# Patient Record
Sex: FEMALE | Race: WHITE | Employment: OTHER | URBAN - METROPOLITAN AREA
[De-identification: names, ages, dates, MRNs, and addresses within clinical notes are randomized per-mention and may not be internally consistent; named-entity substitution may affect disease eponyms.]

---

## 2017-03-22 ENCOUNTER — TELEPHONE (OUTPATIENT)
Dept: FAMILY MEDICINE CLINIC | Facility: CLINIC | Age: 67
End: 2017-03-22

## 2017-03-22 NOTE — TELEPHONE ENCOUNTER
LM asking patient to contact our office to verify what insurance has medicare/supplement or medicare advantage. If patient has medicare advantage please schedule supervisit with Dr. Shan Lemos and enter insurance information!

## 2017-04-13 NOTE — TELEPHONE ENCOUNTER
Patient confirmed that she no longer has straight medicare. Patient now has [facility].

## 2017-07-27 ENCOUNTER — TELEPHONE (OUTPATIENT)
Dept: FAMILY MEDICINE CLINIC | Facility: CLINIC | Age: 67
End: 2017-07-27

## 2017-07-27 NOTE — TELEPHONE ENCOUNTER
Dr Kojo Antoine received Pt's Cologuard results. Test negative. Health Maintenance updated. Pt notified. Letter sent to scan.

## 2017-08-08 ENCOUNTER — TELEPHONE (OUTPATIENT)
Dept: FAMILY MEDICINE CLINIC | Facility: CLINIC | Age: 67
End: 2017-08-08

## 2017-08-13 ENCOUNTER — MA CHART PREP (OUTPATIENT)
Dept: FAMILY MEDICINE CLINIC | Facility: CLINIC | Age: 67
End: 2017-08-13

## 2017-08-13 PROBLEM — M85.80 OSTEOPENIA DETERMINED BY X-RAY: Status: ACTIVE | Noted: 2017-08-13

## 2017-08-14 ENCOUNTER — OFFICE VISIT (OUTPATIENT)
Dept: FAMILY MEDICINE CLINIC | Facility: CLINIC | Age: 67
End: 2017-08-14

## 2017-08-14 VITALS
HEART RATE: 72 BPM | DIASTOLIC BLOOD PRESSURE: 70 MMHG | WEIGHT: 124.38 LBS | HEIGHT: 58.75 IN | TEMPERATURE: 98 F | SYSTOLIC BLOOD PRESSURE: 122 MMHG | BODY MASS INDEX: 25.41 KG/M2 | RESPIRATION RATE: 12 BRPM

## 2017-08-14 DIAGNOSIS — Z00.00 ENCOUNTER FOR ANNUAL HEALTH EXAMINATION: ICD-10-CM

## 2017-08-14 DIAGNOSIS — H80.90 OTOSCLEROSIS, UNSPECIFIED LATERALITY: ICD-10-CM

## 2017-08-14 DIAGNOSIS — Z13.1 SCREENING FOR DIABETES MELLITUS: Primary | ICD-10-CM

## 2017-08-14 DIAGNOSIS — Z12.31 VISIT FOR SCREENING MAMMOGRAM: ICD-10-CM

## 2017-08-14 DIAGNOSIS — Z13.29 SCREENING FOR THYROID DISORDER: ICD-10-CM

## 2017-08-14 DIAGNOSIS — Z13.220 SCREENING CHOLESTEROL LEVEL: ICD-10-CM

## 2017-08-14 DIAGNOSIS — Z13.31 DEPRESSION SCREENING: ICD-10-CM

## 2017-08-14 DIAGNOSIS — M85.80 OSTEOPENIA DETERMINED BY X-RAY: ICD-10-CM

## 2017-08-14 DIAGNOSIS — D35.1 PARATHYROID ADENOMA: ICD-10-CM

## 2017-08-14 PROCEDURE — G0439 PPPS, SUBSEQ VISIT: HCPCS | Performed by: FAMILY MEDICINE

## 2017-08-14 PROCEDURE — 96160 PT-FOCUSED HLTH RISK ASSMT: CPT | Performed by: FAMILY MEDICINE

## 2017-08-14 PROCEDURE — 99397 PER PM REEVAL EST PAT 65+ YR: CPT | Performed by: FAMILY MEDICINE

## 2017-08-14 NOTE — PATIENT INSTRUCTIONS
Asher Pi Ullrich's SCREENING SCHEDULE   Tests on this list are recommended by your physician but may not be covered, or covered at this frequency, by your insurer. Please check with your insurance carrier before scheduling to verify coverage.    PREVENTATI every 10 years- more often if abnormal Colonoscopy,3 Years due on 07/10/2020 Update Health Maintenance if applicable    Flex Sigmoidoscopy Screen  Covered every 5 years No results found for this or any previous visit. No flowsheet data found.      Fecal Occ Pneumococcal 23 (Pneumovax)  Covered Once after 65 No orders found for this or any previous visit. Please get once after your 65th birthday    Hepatitis B for Moderate/High Risk       No orders found for this or any previous visit.  Medium/high risk fact

## 2017-08-14 NOTE — PROGRESS NOTES
HPI:   Ciro Gonzales is a 74year old female who presents for a Medicare Subsequent Annual Wellness visit (Pt already had Initial Annual Wellness). Ultrafast heart CT score was 0 this year. Does Randa 4 times per week.      had mild stroke th not drink alcohol or use drugs.      REVIEW OF SYSTEMS:   GENERAL: feels well otherwise  SKIN: denies any unusual skin lesions  EYES: denies blurred vision or double vision  HEENT: denies nasal congestion, sinus pain or ST  LUNGS: denies shortness of breath symmetric, no tenderness/mass/nodules; no carotid bruit or JVD  Surgery scar present-well healed, good cosmetic appearance   Back:   Symmetric, no curvature, ROM normal, no CVA tenderness   Lungs:   Clear to auscultation bilaterally, respirations unlabored ordered. Mamm ordered. Ms. Ciara Sherwood does not currently take aspirin. We discussed the risks and benefits of aspirin therapy.    Felix Beth is unable to use daily aspirin therapy For the following reasons:   Patient decided that the risks of aspirin Problems?: No     Functional Status     Hearing Problems?: No    Vision Problems? : No    Difficulty walking?: No    Difficulty dressing or bathing?: No    Problems with daily activities? : No    Memory Problems?: No      Fall/Risk Assessment     Do you ha 115        EKG - w/ Initial Preventative Physical Exam only, or if medically necessary Electrocardiogram date12/31/2014       Colorectal Cancer Screening      Colonoscopy Screen every 10 years Colonoscopy,3 Years due on 07/10/2020 Update [facility] Medicare Part B 01/01/1995 This may be covered with your prescription benefits, but Medicare does not cover unless Medically needed    Zoster  Not covered by Medicare Part B No vaccine history found This may be covered with your pharmacy  prescription bene

## 2017-09-08 ENCOUNTER — HOSPITAL ENCOUNTER (OUTPATIENT)
Dept: BONE DENSITY | Age: 67
Discharge: HOME | End: 2017-09-08
Attending: FAMILY MEDICINE
Payer: MEDICARE

## 2017-09-08 ENCOUNTER — HOSPITAL ENCOUNTER (OUTPATIENT)
Dept: MAMMOGRAPHY | Age: 67
Discharge: HOME | End: 2017-09-08
Attending: FAMILY MEDICINE
Payer: MEDICARE

## 2017-09-08 DIAGNOSIS — Z12.31 VISIT FOR SCREENING MAMMOGRAM: ICD-10-CM

## 2017-09-08 DIAGNOSIS — Z78.0 POST-MENOPAUSAL: ICD-10-CM

## 2017-09-08 PROCEDURE — 77080 DXA BONE DENSITY AXIAL: CPT | Performed by: FAMILY MEDICINE

## 2017-09-08 PROCEDURE — 77067 SCR MAMMO BI INCL CAD: CPT | Performed by: FAMILY MEDICINE

## 2017-09-28 ENCOUNTER — LAB ENCOUNTER (OUTPATIENT)
Dept: LAB | Age: 67
End: 2017-09-28
Attending: FAMILY MEDICINE
Payer: MEDICARE

## 2017-09-28 DIAGNOSIS — Z13.29 SCREENING FOR THYROID DISORDER: ICD-10-CM

## 2017-09-28 DIAGNOSIS — Z13.1 SCREENING FOR DIABETES MELLITUS: ICD-10-CM

## 2017-09-28 LAB
ALBUMIN SERPL-MCNC: 4 G/DL (ref 3.5–4.8)
ALP LIVER SERPL-CCNC: 80 U/L (ref 55–142)
ALT SERPL-CCNC: 25 U/L (ref 14–54)
AST SERPL-CCNC: 16 U/L (ref 15–41)
BILIRUB SERPL-MCNC: 0.6 MG/DL (ref 0.1–2)
BUN BLD-MCNC: 13 MG/DL (ref 8–20)
CALCIUM BLD-MCNC: 9.2 MG/DL (ref 8.3–10.3)
CHLORIDE: 105 MMOL/L (ref 101–111)
CHOLEST SMN-MCNC: 243 MG/DL (ref ?–200)
CO2: 28 MMOL/L (ref 22–32)
CREAT BLD-MCNC: 0.67 MG/DL (ref 0.55–1.02)
GLUCOSE BLD-MCNC: 83 MG/DL (ref 70–99)
HDLC SERPL-MCNC: 89 MG/DL (ref 45–?)
HDLC SERPL: 2.73 {RATIO} (ref ?–4.44)
LDLC SERPL CALC-MCNC: 138 MG/DL (ref ?–130)
LDLC SERPL-MCNC: 16 MG/DL (ref 5–40)
M PROTEIN MFR SERPL ELPH: 7.6 G/DL (ref 6.1–8.3)
NONHDLC SERPL-MCNC: 154 MG/DL (ref ?–130)
POTASSIUM SERPL-SCNC: 4.2 MMOL/L (ref 3.6–5.1)
SODIUM SERPL-SCNC: 140 MMOL/L (ref 136–144)
TRIGLYCERIDES: 82 MG/DL (ref ?–150)
TSI SER-ACNC: 1.59 MIU/ML (ref 0.35–5.5)

## 2017-09-28 PROCEDURE — 84443 ASSAY THYROID STIM HORMONE: CPT | Performed by: FAMILY MEDICINE

## 2017-09-28 PROCEDURE — 36415 COLL VENOUS BLD VENIPUNCTURE: CPT | Performed by: FAMILY MEDICINE

## 2017-09-28 PROCEDURE — 80061 LIPID PANEL: CPT | Performed by: FAMILY MEDICINE

## 2017-09-28 PROCEDURE — 80053 COMPREHEN METABOLIC PANEL: CPT | Performed by: FAMILY MEDICINE

## 2017-09-29 ENCOUNTER — PATIENT MESSAGE (OUTPATIENT)
Dept: FAMILY MEDICINE CLINIC | Facility: CLINIC | Age: 67
End: 2017-09-29

## 2017-09-29 NOTE — TELEPHONE ENCOUNTER
From: Sarah Wade  To: Nelda Benton  Sent: 9/29/2017 9:47 AM CDT  Subject: Test Results Question    Hi Leidy. I had my blood drawn on 9/28/17 and I don't see my Cholosterol or HLD or LDL or Triglycerides Count. Am I missing that?  Just suman

## 2017-11-27 ENCOUNTER — OFFICE VISIT (OUTPATIENT)
Dept: FAMILY MEDICINE CLINIC | Facility: CLINIC | Age: 67
End: 2017-11-27

## 2017-11-27 ENCOUNTER — PATIENT MESSAGE (OUTPATIENT)
Dept: FAMILY MEDICINE CLINIC | Facility: CLINIC | Age: 67
End: 2017-11-27

## 2017-11-27 VITALS
HEART RATE: 76 BPM | DIASTOLIC BLOOD PRESSURE: 74 MMHG | RESPIRATION RATE: 16 BRPM | BODY MASS INDEX: 25.55 KG/M2 | TEMPERATURE: 99 F | SYSTOLIC BLOOD PRESSURE: 142 MMHG | HEIGHT: 58.25 IN | WEIGHT: 123.38 LBS

## 2017-11-27 DIAGNOSIS — J02.9 SORE THROAT: Primary | ICD-10-CM

## 2017-11-27 DIAGNOSIS — R10.30 LOWER ABDOMINAL PAIN: ICD-10-CM

## 2017-11-27 PROCEDURE — 87880 STREP A ASSAY W/OPTIC: CPT | Performed by: PHYSICIAN ASSISTANT

## 2017-11-27 PROCEDURE — 99214 OFFICE O/P EST MOD 30 MIN: CPT | Performed by: PHYSICIAN ASSISTANT

## 2017-11-27 NOTE — PROGRESS NOTES
CC:  Patient presents with:  Sore Throat: Sore throat present 3 days, getting worse. Recently had strep throat.   Blood Pressure: 140/98 and 142/74      HPI: Cheri Aguilera presents with complaints of mild lower abdominal pain for a few days, along with a ST and mi behavior    Physical Exam :  /74   Pulse 76   Temp 98.8 °F (37.1 °C) (Oral)   Resp 16   Ht 58.25\"   Wt 123 lb 6.4 oz   BMI 25.57 kg/m²     Vital signs reviewed.     Constitutional: Well developed, well nourished, in no acute distress  HENT:   Head: N Otosclerosis     Vitamin D deficiency     Parathyroid adenoma     Thyroid disease     Osteopenia determined by x-ray        Orders Placed This Encounter      Rapid Strep    No prescriptions requested or ordered in this encounter

## 2017-11-27 NOTE — TELEPHONE ENCOUNTER
From: Monique Donis  To: Renu Cummins MD  Sent: 11/27/2017 8:27 AM CST  Subject: Other    I had a severe sore throat on Nov. 5th (Sunday) and I went to a [address] [place], [place] [address]e. They tested me for Strep and it was positive. I took Amoxicillian 875 mg.  f

## 2018-03-19 ENCOUNTER — TELEPHONE (OUTPATIENT)
Dept: FAMILY MEDICINE CLINIC | Facility: CLINIC | Age: 68
End: 2018-03-19

## 2018-03-26 ENCOUNTER — NURSE ONLY (OUTPATIENT)
Dept: FAMILY MEDICINE CLINIC | Facility: CLINIC | Age: 68
End: 2018-03-26

## 2018-03-26 VITALS — TEMPERATURE: 99 F

## 2018-03-26 PROCEDURE — 90732 PPSV23 VACC 2 YRS+ SUBQ/IM: CPT | Performed by: FAMILY MEDICINE

## 2018-03-26 PROCEDURE — G0009 ADMIN PNEUMOCOCCAL VACCINE: HCPCS | Performed by: FAMILY MEDICINE

## 2018-04-10 ENCOUNTER — TELEPHONE (OUTPATIENT)
Dept: FAMILY MEDICINE CLINIC | Facility: CLINIC | Age: 68
End: 2018-04-10

## 2018-07-16 ENCOUNTER — TELEPHONE (OUTPATIENT)
Dept: FAMILY MEDICINE CLINIC | Facility: CLINIC | Age: 68
End: 2018-07-16

## 2018-07-16 DIAGNOSIS — Z13.220 SCREENING CHOLESTEROL LEVEL: ICD-10-CM

## 2018-07-16 DIAGNOSIS — Z13.228 SCREENING FOR METABOLIC DISORDER: ICD-10-CM

## 2018-07-16 DIAGNOSIS — E07.9 THYROID DISEASE: ICD-10-CM

## 2018-07-16 DIAGNOSIS — Z00.00 ROUTINE GENERAL MEDICAL EXAMINATION AT A HEALTH CARE FACILITY: Primary | ICD-10-CM

## 2018-07-16 DIAGNOSIS — Z13.0 SCREENING FOR DEFICIENCY ANEMIA: ICD-10-CM

## 2018-07-16 NOTE — TELEPHONE ENCOUNTER
Patient is scheduled for her MA supervisit 08/09/18 and would like to have blood work done prior to appointment.  Please send orders to Cinthia golden

## 2018-07-25 ENCOUNTER — LAB ENCOUNTER (OUTPATIENT)
Dept: LAB | Age: 68
End: 2018-07-25
Attending: FAMILY MEDICINE
Payer: MEDICARE

## 2018-07-25 DIAGNOSIS — Z13.228 SCREENING FOR METABOLIC DISORDER: ICD-10-CM

## 2018-07-25 DIAGNOSIS — Z13.220 SCREENING CHOLESTEROL LEVEL: ICD-10-CM

## 2018-07-25 DIAGNOSIS — Z00.00 ROUTINE GENERAL MEDICAL EXAMINATION AT A HEALTH CARE FACILITY: ICD-10-CM

## 2018-07-25 DIAGNOSIS — E07.9 THYROID DISEASE: ICD-10-CM

## 2018-07-25 DIAGNOSIS — Z13.0 SCREENING FOR DEFICIENCY ANEMIA: ICD-10-CM

## 2018-07-25 LAB
ALBUMIN SERPL-MCNC: 3.9 G/DL (ref 3.5–4.8)
ALBUMIN/GLOB SERPL: 1 {RATIO} (ref 1–2)
ALP LIVER SERPL-CCNC: 86 U/L (ref 55–142)
ALT SERPL-CCNC: 42 U/L (ref 14–54)
ANION GAP SERPL CALC-SCNC: 8 MMOL/L (ref 0–18)
AST SERPL-CCNC: 34 U/L (ref 15–41)
BASOPHILS # BLD AUTO: 0.03 X10(3) UL (ref 0–0.1)
BASOPHILS NFR BLD AUTO: 0.6 %
BILIRUB SERPL-MCNC: 0.6 MG/DL (ref 0.1–2)
BUN BLD-MCNC: 13 MG/DL (ref 8–20)
BUN/CREAT SERPL: 18.8 (ref 10–20)
CALCIUM BLD-MCNC: 9.3 MG/DL (ref 8.3–10.3)
CHLORIDE SERPL-SCNC: 108 MMOL/L (ref 101–111)
CHOLEST SMN-MCNC: 182 MG/DL (ref ?–200)
CO2 SERPL-SCNC: 27 MMOL/L (ref 22–32)
CREAT BLD-MCNC: 0.69 MG/DL (ref 0.55–1.02)
EOSINOPHIL # BLD AUTO: 0.19 X10(3) UL (ref 0–0.3)
EOSINOPHIL NFR BLD AUTO: 3.5 %
ERYTHROCYTE [DISTWIDTH] IN BLOOD BY AUTOMATED COUNT: 13.5 % (ref 11.5–16)
GLOBULIN PLAS-MCNC: 3.8 G/DL (ref 2.5–3.7)
GLUCOSE BLD-MCNC: 83 MG/DL (ref 70–99)
HCT VFR BLD AUTO: 42.1 % (ref 34–50)
HDLC SERPL-MCNC: 69 MG/DL (ref 40–59)
HGB BLD-MCNC: 13.7 G/DL (ref 12–16)
IMMATURE GRANULOCYTE COUNT: 0.03 X10(3) UL (ref 0–1)
IMMATURE GRANULOCYTE RATIO %: 0.6 %
LDLC SERPL CALC-MCNC: 98 MG/DL (ref ?–100)
LYMPHOCYTES # BLD AUTO: 1.74 X10(3) UL (ref 0.9–4)
LYMPHOCYTES NFR BLD AUTO: 32.3 %
M PROTEIN MFR SERPL ELPH: 7.7 G/DL (ref 6.1–8.3)
MCH RBC QN AUTO: 30.4 PG (ref 27–33.2)
MCHC RBC AUTO-ENTMCNC: 32.5 G/DL (ref 31–37)
MCV RBC AUTO: 93.6 FL (ref 81–100)
MONOCYTES # BLD AUTO: 0.54 X10(3) UL (ref 0.1–1)
MONOCYTES NFR BLD AUTO: 10 %
NEUTROPHIL ABS PRELIM: 2.85 X10 (3) UL (ref 1.3–6.7)
NEUTROPHILS # BLD AUTO: 2.85 X10(3) UL (ref 1.3–6.7)
NEUTROPHILS NFR BLD AUTO: 53 %
NONHDLC SERPL-MCNC: 113 MG/DL (ref ?–130)
OSMOLALITY SERPL CALC.SUM OF ELEC: 295 MOSM/KG (ref 275–295)
PLATELET # BLD AUTO: 185 10(3)UL (ref 150–450)
POTASSIUM SERPL-SCNC: 4.3 MMOL/L (ref 3.6–5.1)
RBC # BLD AUTO: 4.5 X10(6)UL (ref 3.8–5.1)
RED CELL DISTRIBUTION WIDTH-SD: 45.8 FL (ref 35.1–46.3)
SODIUM SERPL-SCNC: 143 MMOL/L (ref 136–144)
TRIGL SERPL-MCNC: 73 MG/DL (ref 30–149)
TSI SER-ACNC: 1 MIU/ML (ref 0.35–5.5)
VLDLC SERPL CALC-MCNC: 15 MG/DL (ref 0–30)
WBC # BLD AUTO: 5.4 X10(3) UL (ref 4–13)

## 2018-07-25 PROCEDURE — 36415 COLL VENOUS BLD VENIPUNCTURE: CPT

## 2018-07-25 PROCEDURE — 80061 LIPID PANEL: CPT

## 2018-07-25 PROCEDURE — 85025 COMPLETE CBC W/AUTO DIFF WBC: CPT

## 2018-07-25 PROCEDURE — 84443 ASSAY THYROID STIM HORMONE: CPT

## 2018-07-25 PROCEDURE — 80053 COMPREHEN METABOLIC PANEL: CPT

## 2018-08-03 ENCOUNTER — TELEPHONE (OUTPATIENT)
Dept: FAMILY MEDICINE CLINIC | Facility: CLINIC | Age: 68
End: 2018-08-03

## 2018-08-09 ENCOUNTER — OFFICE VISIT (OUTPATIENT)
Dept: FAMILY MEDICINE CLINIC | Facility: CLINIC | Age: 68
End: 2018-08-09
Payer: COMMERCIAL

## 2018-08-09 VITALS
HEIGHT: 58.5 IN | TEMPERATURE: 98 F | SYSTOLIC BLOOD PRESSURE: 122 MMHG | HEART RATE: 76 BPM | RESPIRATION RATE: 12 BRPM | WEIGHT: 127.38 LBS | DIASTOLIC BLOOD PRESSURE: 70 MMHG | BODY MASS INDEX: 26.03 KG/M2

## 2018-08-09 DIAGNOSIS — Z00.00 ENCOUNTER FOR ANNUAL HEALTH EXAMINATION: Primary | ICD-10-CM

## 2018-08-09 DIAGNOSIS — Z23 NEED FOR VACCINATION: ICD-10-CM

## 2018-08-09 DIAGNOSIS — Z86.018 HISTORY OF DYSPLASTIC NEVUS: ICD-10-CM

## 2018-08-09 DIAGNOSIS — M79.672 BILATERAL FOOT PAIN: ICD-10-CM

## 2018-08-09 DIAGNOSIS — Z12.31 VISIT FOR SCREENING MAMMOGRAM: ICD-10-CM

## 2018-08-09 DIAGNOSIS — M85.80 OSTEOPENIA DETERMINED BY X-RAY: ICD-10-CM

## 2018-08-09 DIAGNOSIS — M79.671 BILATERAL FOOT PAIN: ICD-10-CM

## 2018-08-09 PROCEDURE — 99397 PER PM REEVAL EST PAT 65+ YR: CPT | Performed by: FAMILY MEDICINE

## 2018-08-09 PROCEDURE — G0438 PPPS, INITIAL VISIT: HCPCS | Performed by: FAMILY MEDICINE

## 2018-08-09 PROCEDURE — 96160 PT-FOCUSED HLTH RISK ASSMT: CPT | Performed by: FAMILY MEDICINE

## 2018-08-09 NOTE — PATIENT INSTRUCTIONS
You are due for Tdap (tetanus vaccination) and Shingrix (shingles). This can be given at your local pharmacy.       Lanier Brink Ullrich's SCREENING SCHEDULE   Tests on this list are recommended by your physician but may not be covered, or covered at this frequ 65-75) IPPE only No results found for this or any previous visit.  Limited to patients who meet one of the following criteria:   • Men who are 73-68 years old and have smoked more than 100 cigarettes in their lifetime   • Anyone with a family history    Col on 09/08/2018 Please get this Mammogram regularly   Immunizations      Influenza  Covered Annually No orders found for this or any previous visit.  Please get every year    Pneumococcal 13 (Prevnar)  Covered Once after 65   Orders placed or performed in vis [facility] regarding Advance Directives.

## 2018-08-09 NOTE — PROGRESS NOTES
HPI:   Lorena Vera is a 69year old female who presents for a MA (Medicare Advantage) [facility] (Once per calendar year). Randa 3 times per week and Bootcamp 2 once weekly. Annual Physical due on 08/14/2018. Right foot pain on plantar aspect. 182 07/25/2018   HDL 69 (H) 07/25/2018   LDL 98 07/25/2018   TRIG 73 07/25/2018          Last Chemistry Labs:     Lab Results  Component Value Date   AST 34 07/25/2018   ALT 42 07/25/2018   CA 9.3 07/25/2018   ALB 3.9 07/25/2018   TSH 0.998 07/25/2018   CR complaint of urinary incontinence   MUSCULOSKELETAL: denies back pain  NEURO: denies headaches  PSYCHE: denies depression or anxiety  HEMATOLOGIC: denies hx of anemia  ENDOCRINE: denies thyroid history  ALL/ASTHMA: denies hx of allergy or asthma    EXAM: turgor normal, no rashes or lesions   Lymph nodes: Cervical, supraclavicular, and axillary nodes normal   Neurologic: Normal       Vaccination History     Immunization History   Administered Date(s) Administered   • Pneumococcal (Prevnar 13) 10/19/2016   • describe your daily physical activity?: Moderate  How would you describe your current health state?: Good  How do you maintain positive mental well-being?: Social Interaction;Games;Puzzles; Visiting Family; Visiting Friends      This section provided for [employer] Mammogram due on 09/08/2018 Update Health Maintenance if applicable     Immunizations (Update Immunization Activity if applicable)     Influenza  Covered Annually  Please get every year    Pneumococcal 13 (Prevnar)  Covered Once after 65 10/19/2016 Please

## 2018-08-13 ENCOUNTER — TELEPHONE (OUTPATIENT)
Dept: FAMILY MEDICINE CLINIC | Facility: CLINIC | Age: 68
End: 2018-08-13

## 2018-08-13 NOTE — TELEPHONE ENCOUNTER
Received fax from [employer] for a PA for Tretinoin 0.50% Cream 45GM. Paperwork in triage. Spoke to pt explaining the PA process, she verbalized understanding.

## 2018-08-21 NOTE — TELEPHONE ENCOUNTER
Called pt to inform her that insurance would not cover Tretinoin for her particular use. Advised her about GoodRx where she could obtain this at several of the local pharmacies at a cost savings. Pt thanked me for this info.

## 2019-02-13 ENCOUNTER — TELEPHONE (OUTPATIENT)
Dept: FAMILY MEDICINE CLINIC | Facility: CLINIC | Age: 69
End: 2019-02-13

## 2019-06-07 ENCOUNTER — HOSPITAL ENCOUNTER (OUTPATIENT)
Dept: MAMMOGRAPHY | Age: 69
Discharge: HOME | End: 2019-06-07
Attending: FAMILY MEDICINE
Payer: MEDICARE

## 2019-06-07 DIAGNOSIS — Z12.31 VISIT FOR SCREENING MAMMOGRAM: ICD-10-CM

## 2019-06-07 PROCEDURE — 77067 SCR MAMMO BI INCL CAD: CPT | Performed by: FAMILY MEDICINE

## 2019-06-07 PROCEDURE — 77063 BREAST TOMOSYNTHESIS BI: CPT | Performed by: FAMILY MEDICINE

## 2019-06-11 ENCOUNTER — TELEPHONE (OUTPATIENT)
Dept: FAMILY MEDICINE CLINIC | Facility: CLINIC | Age: 69
End: 2019-06-11

## 2019-06-11 DIAGNOSIS — Z13.228 SCREENING FOR ENDOCRINE, METABOLIC AND IMMUNITY DISORDER: ICD-10-CM

## 2019-06-11 DIAGNOSIS — Z13.29 SCREENING FOR ENDOCRINE, METABOLIC AND IMMUNITY DISORDER: ICD-10-CM

## 2019-06-11 DIAGNOSIS — E07.9 THYROID DISEASE: Primary | ICD-10-CM

## 2019-06-11 DIAGNOSIS — Z13.0 SCREENING FOR ENDOCRINE, METABOLIC AND IMMUNITY DISORDER: ICD-10-CM

## 2019-06-11 DIAGNOSIS — Z13.220 SCREENING, LIPID: ICD-10-CM

## 2019-06-11 DIAGNOSIS — Z13.0 SCREENING, ANEMIA, DEFICIENCY, IRON: ICD-10-CM

## 2019-06-11 NOTE — TELEPHONE ENCOUNTER
Please enter lab orders for the patient's upcoming physical appointment.      Physical scheduled: 0624/19 at 3 pm with JANIS Luna     Preferred lab: [facility]     The patient has been notified to complete fasting labs prior to their physical appoin

## 2019-06-13 ENCOUNTER — PATIENT MESSAGE (OUTPATIENT)
Dept: FAMILY MEDICINE CLINIC | Facility: CLINIC | Age: 69
End: 2019-06-13

## 2019-06-13 NOTE — TELEPHONE ENCOUNTER
From: Niko Cardenas  To: Harsha Delgadillo  Sent: 6/13/2019 4:16 PM CDT  Subject: Non-Urgent Medical Question    I have my Wellness visit coming up June 24th. Do you want me to get a blood test before that visit?  If so, I need for you to put in an or

## 2019-06-18 ENCOUNTER — TELEPHONE (OUTPATIENT)
Dept: FAMILY MEDICINE CLINIC | Facility: CLINIC | Age: 69
End: 2019-06-18

## 2019-06-18 ENCOUNTER — LAB ENCOUNTER (OUTPATIENT)
Dept: LAB | Facility: HOSPITAL | Age: 69
End: 2019-06-18
Attending: FAMILY MEDICINE
Payer: MEDICARE

## 2019-06-18 DIAGNOSIS — Z13.29 SCREENING FOR ENDOCRINE, METABOLIC AND IMMUNITY DISORDER: ICD-10-CM

## 2019-06-18 DIAGNOSIS — Z13.0 SCREENING FOR ENDOCRINE, METABOLIC AND IMMUNITY DISORDER: ICD-10-CM

## 2019-06-18 DIAGNOSIS — E07.9 THYROID DISEASE: ICD-10-CM

## 2019-06-18 DIAGNOSIS — Z13.228 SCREENING FOR ENDOCRINE, METABOLIC AND IMMUNITY DISORDER: ICD-10-CM

## 2019-06-18 DIAGNOSIS — Z13.0 SCREENING, ANEMIA, DEFICIENCY, IRON: ICD-10-CM

## 2019-06-18 DIAGNOSIS — Z13.220 SCREENING, LIPID: ICD-10-CM

## 2019-06-18 PROCEDURE — 84443 ASSAY THYROID STIM HORMONE: CPT

## 2019-06-18 PROCEDURE — 36415 COLL VENOUS BLD VENIPUNCTURE: CPT

## 2019-06-18 PROCEDURE — 80053 COMPREHEN METABOLIC PANEL: CPT

## 2019-06-18 PROCEDURE — 85025 COMPLETE CBC W/AUTO DIFF WBC: CPT

## 2019-06-18 PROCEDURE — 80061 LIPID PANEL: CPT

## 2019-06-24 ENCOUNTER — OFFICE VISIT (OUTPATIENT)
Dept: FAMILY MEDICINE CLINIC | Facility: CLINIC | Age: 69
End: 2019-06-24
Payer: COMMERCIAL

## 2019-06-24 VITALS
BODY MASS INDEX: 26.32 KG/M2 | WEIGHT: 125.38 LBS | TEMPERATURE: 99 F | DIASTOLIC BLOOD PRESSURE: 88 MMHG | HEIGHT: 58 IN | SYSTOLIC BLOOD PRESSURE: 138 MMHG | HEART RATE: 80 BPM

## 2019-06-24 DIAGNOSIS — Z00.00 ENCOUNTER FOR ANNUAL HEALTH EXAMINATION: Primary | ICD-10-CM

## 2019-06-24 DIAGNOSIS — H80.90 OTOSCLEROSIS, UNSPECIFIED LATERALITY: ICD-10-CM

## 2019-06-24 DIAGNOSIS — E07.9 THYROID DISEASE: ICD-10-CM

## 2019-06-24 DIAGNOSIS — D35.1 PARATHYROID ADENOMA: ICD-10-CM

## 2019-06-24 PROCEDURE — 96160 PT-FOCUSED HLTH RISK ASSMT: CPT | Performed by: FAMILY MEDICINE

## 2019-06-24 PROCEDURE — G0439 PPPS, SUBSEQ VISIT: HCPCS | Performed by: FAMILY MEDICINE

## 2019-06-24 PROCEDURE — 99397 PER PM REEVAL EST PAT 65+ YR: CPT | Performed by: FAMILY MEDICINE

## 2019-06-24 NOTE — PATIENT INSTRUCTIONS
Guadelupe Crystal Ullrich's SCREENING SCHEDULE   Tests on this list are recommended by your physician but may not be covered, or covered at this frequency, by your insurer. Please check with your insurance carrier before scheduling to verify coverage.    PREVENTATI Men who are 73-68 years old and have smoked more than 100 cigarettes in their lifetime   • Anyone with a family history    Colorectal Cancer Screening  Covered up to Age 76     Colonoscopy Screen   Covered every 10 years- more often if abnormal Colonoscopy previous visit.  Please get every year    Pneumococcal 13 (Prevnar)  Covered Once after 65 Orders placed or performed in visit on 10/19/16   • PNEUMOCOCCAL VACC, 13 LATRICE IM    Please get once after your 65th birthday    Pneumococcal 23 (Pneumovax)  Covered O

## 2019-06-24 NOTE — PROGRESS NOTES
HPI:   Joceline Brown is a 77year old female who presents for a MA (Medicare Advantage) [address] [place] [address] (Once per calendar year). Needs Retin A cream refilled. Annual Physical due on 08/09/2019      On Atkins Diet. Randa 3 days per week.   [employer] CBC  (most recent labs)   Lab Results   Component Value Date    WBC 5.9 06/18/2019    HGB 13.6 06/18/2019    .0 06/18/2019        ALLERGIES:   She has No Known Allergies.     CURRENT MEDICATIONS:     Outpatient Medications Marked as Taking for Screening Method:  Whisper Test  Whisper Test Result:  Pass            Visual Acuity  Right Eye Visual Acuity: Corrected Right Eye Chart Acuity: 20/30   Left Eye Visual Acuity: Corrected Left Eye Chart Acuity: 20/20   Both Eyes Visual Acuity: Corrected B annual health examination    Parathyroid adenoma     -resected    Thyroid disease     -normal thyroid function currently    Otosclerosis, unspecified laterality      -Sees Jaleesa, ENT as needed.   Other orders  -     Tretinoin (RETIN-A) 0.05 % External Cr found. Fecal Occult Blood Annually No results found for: FOB No flowsheet data found. Glaucoma Screening      Ophthalmology Visit Annually: Diabetics, FHx Glaucoma, AA>50, > 65 No flowsheet data found.     Bone Density Screening      Dexascan

## 2019-09-17 ENCOUNTER — PATIENT MESSAGE (OUTPATIENT)
Dept: FAMILY MEDICINE CLINIC | Facility: CLINIC | Age: 69
End: 2019-09-17

## 2019-09-17 DIAGNOSIS — L82.1 SEBORRHEIC KERATOSIS: Primary | ICD-10-CM

## 2019-09-17 NOTE — TELEPHONE ENCOUNTER
From: Niko Cardenas  To: Harsha Delgadillo  Sent: 9/17/2019 2:50 PM CDT  Subject: Referral Request    I need a referral for my Dermatologist appointment set for October 1st at [place] [address]. I have a mole that I would like to get checked.  Thank you

## 2019-09-18 NOTE — TELEPHONE ENCOUNTER
Referral request Dr. Gail Baez M.D.,Dermatology    Patient seen by Gail Baez M.D. 9/24/19  Peder Kal angioma  Seborrheic keratosis

## 2019-10-06 ENCOUNTER — PATIENT MESSAGE (OUTPATIENT)
Dept: FAMILY MEDICINE CLINIC | Facility: CLINIC | Age: 69
End: 2019-10-06

## 2019-10-07 NOTE — TELEPHONE ENCOUNTER
From: Philly Lopez  To: Rigoberto Billy  Sent: 10/6/2019 3:32 PM CDT  Subject: Referral Request    I have an upcoming appointment at Dr. Rufino Wheeler office on October 15. Your office informed me that they put a referral in a few weeks ago.  However, oleksandr

## 2019-12-02 ENCOUNTER — TELEPHONE (OUTPATIENT)
Dept: FAMILY MEDICINE CLINIC | Facility: CLINIC | Age: 69
End: 2019-12-02

## 2019-12-02 DIAGNOSIS — Z12.11 ENCOUNTER FOR SCREENING FECAL OCCULT BLOOD TESTING: Primary | ICD-10-CM

## 2019-12-02 NOTE — TELEPHONE ENCOUNTER
Received incoming mail from [address] [place] [address] stating patient completed (FIT TEST) however, sample could not be processed. Patient needs to repeat.    Mail in triage

## 2020-02-26 ENCOUNTER — TELEPHONE (OUTPATIENT)
Dept: FAMILY MEDICINE CLINIC | Facility: CLINIC | Age: 70
End: 2020-02-26

## 2020-05-26 ENCOUNTER — TELEPHONE (OUTPATIENT)
Dept: FAMILY MEDICINE CLINIC | Facility: CLINIC | Age: 70
End: 2020-05-26

## 2020-06-26 ENCOUNTER — TELEPHONE (OUTPATIENT)
Dept: FAMILY MEDICINE CLINIC | Facility: CLINIC | Age: 70
End: 2020-06-26

## 2020-09-30 ENCOUNTER — TELEPHONE (OUTPATIENT)
Dept: FAMILY MEDICINE CLINIC | Facility: CLINIC | Age: 70
End: 2020-09-30

## 2020-12-07 ENCOUNTER — TELEPHONE (OUTPATIENT)
Dept: FAMILY MEDICINE CLINIC | Facility: CLINIC | Age: 70
End: 2020-12-07

## 2020-12-07 DIAGNOSIS — Z12.31 VISIT FOR SCREENING MAMMOGRAM: ICD-10-CM

## 2020-12-07 DIAGNOSIS — Z00.00 LABORATORY EXAMINATION ORDERED AS PART OF A ROUTINE GENERAL MEDICAL EXAMINATION: ICD-10-CM

## 2020-12-07 DIAGNOSIS — E55.9 VITAMIN D DEFICIENCY: ICD-10-CM

## 2020-12-07 DIAGNOSIS — Z11.59 ENCOUNTER FOR HEPATITIS C SCREENING TEST FOR LOW RISK PATIENT: ICD-10-CM

## 2020-12-07 NOTE — TELEPHONE ENCOUNTER
Please enter lab orders for the patient's upcoming physical appointment. Physical scheduled:    Your appointments     Date & Time Appointment Department [place] [facility])    Dec 14, 2020  3:45 PM KATY PEREZ Supervisit with Letha To PA-C [facility],

## 2020-12-07 NOTE — TELEPHONE ENCOUNTER
Please enter lab orders for the patient's upcoming physical appointment. Physical scheduled:    Your appointments     Date & Time Appointment Department [facility])    Dec 14, 2020  3:45 PM KATY PEREZ Supervisit with Abhishek Patel PA-C [address] [place] [address],

## 2020-12-07 NOTE — TELEPHONE ENCOUNTER
Patient is requesting an order for her annual screening mammogram.    Patient has been notified to allow 2-3 business days for order placement. Reviewed with patient that she may schedule mammogram via [employer] or by calling Central Scheduling at that time.

## 2020-12-14 ENCOUNTER — TELEPHONE (OUTPATIENT)
Dept: FAMILY MEDICINE CLINIC | Facility: CLINIC | Age: 70
End: 2020-12-14

## 2020-12-14 ENCOUNTER — OFFICE VISIT (OUTPATIENT)
Dept: FAMILY MEDICINE CLINIC | Facility: CLINIC | Age: 70
End: 2020-12-14
Payer: COMMERCIAL

## 2020-12-14 VITALS
TEMPERATURE: 98 F | HEART RATE: 88 BPM | RESPIRATION RATE: 16 BRPM | BODY MASS INDEX: 27.58 KG/M2 | SYSTOLIC BLOOD PRESSURE: 142 MMHG | HEIGHT: 58 IN | DIASTOLIC BLOOD PRESSURE: 78 MMHG | WEIGHT: 131.38 LBS

## 2020-12-14 DIAGNOSIS — Z12.31 VISIT FOR SCREENING MAMMOGRAM: ICD-10-CM

## 2020-12-14 DIAGNOSIS — N94.10 DYSPAREUNIA IN FEMALE: ICD-10-CM

## 2020-12-14 DIAGNOSIS — Z00.00 ENCOUNTER FOR ANNUAL HEALTH EXAMINATION: Primary | ICD-10-CM

## 2020-12-14 DIAGNOSIS — H80.90 OTOSCLEROSIS, UNSPECIFIED LATERALITY: ICD-10-CM

## 2020-12-14 DIAGNOSIS — M85.80 OSTEOPENIA DETERMINED BY X-RAY: ICD-10-CM

## 2020-12-14 PROCEDURE — 96160 PT-FOCUSED HLTH RISK ASSMT: CPT | Performed by: FAMILY MEDICINE

## 2020-12-14 PROCEDURE — 3008F BODY MASS INDEX DOCD: CPT | Performed by: FAMILY MEDICINE

## 2020-12-14 PROCEDURE — 99397 PER PM REEVAL EST PAT 65+ YR: CPT | Performed by: FAMILY MEDICINE

## 2020-12-14 PROCEDURE — 3078F DIAST BP <80 MM HG: CPT | Performed by: FAMILY MEDICINE

## 2020-12-14 PROCEDURE — G0439 PPPS, SUBSEQ VISIT: HCPCS | Performed by: FAMILY MEDICINE

## 2020-12-14 PROCEDURE — 3077F SYST BP >= 140 MM HG: CPT | Performed by: FAMILY MEDICINE

## 2020-12-14 NOTE — TELEPHONE ENCOUNTER
Request for PA through [facility]  Payer Waiting for Response    Deadline to reply:  December 17, 2020 4:40 PM (In 3 days)   Case ID: [id number]      Payer:  Mandeep Gregory [facility] Part D    [phone]     [phone]    Prior Authorization Request Evaluation Question

## 2020-12-14 NOTE — PATIENT INSTRUCTIONS
Mercer Rout Ullrich's SCREENING SCHEDULE   Tests on this list are recommended by your physician but may not be covered, or covered at this frequency, by your insurer. Please check with your insurance carrier before scheduling to verify coverage.    PREVENTATI Men who are 73-68 years old and have smoked more than 100 cigarettes in their lifetime   • Anyone with a family history    Colorectal Cancer Screening  Covered up to Age 76     Colonoscopy Screen   Covered every 10 years- more often if abnormal Colonoscopy previous visit.  Please get every year    Pneumococcal 13 (Prevnar)  Covered Once after 65 Orders placed or performed in visit on 10/19/16   • PNEUMOCOCCAL VACC, 13 LATRICE IM    Please get once after your 65th birthday    Pneumococcal 23 (Pneumovax)  Covered O

## 2020-12-14 NOTE — PROGRESS NOTES
HPI:   Cammie Varela is a 68year old female who presents for a MA (Medicare Advantage) [facility] [facility] (Once per calendar year). Does Boot camp, walking 4 miles twice a week. Randa class is cancelled for now. She has no complaints.     Her last radha 06/18/2019    ALT 34 06/18/2019    CA 9.4 06/18/2019    ALB 3.8 06/18/2019    TSH 1.820 06/18/2019    CREATSERUM 0.65 06/18/2019    GLU 92 06/18/2019        CBC  (most recent labs)   Lab Results   Component Value Date    WBC 5.9 06/18/2019    HGB 13.6 06/1 lb 6.4 oz (59.6 kg).     Medicare Hearing Assessment  (Required for AWV/SWV)    Hearing Screening    Screening Method: Finger Rub  Finger Rub Result: Pass             Visual Acuity    Right Eye Chart Acuity: 20/40     Left Eye Chart Acuity: 20/25     Both E PLAN SUMMARY:   Diagnoses and all orders for this visit:    Encounter for annual health examination    Visit for screening mammogram  -     CHAMP SCREENING BILAT (CPT=77067); Future    Otosclerosis, unspecified laterality  Hx of surgery. This is stable. LDL-CHOLESTEROL (mg/dL (calc))   Date Value   09/19/2014 115        EKG - w/ Initial Preventative Physical Exam only, or if medically necessary Electrocardiogram date       Colorectal Cancer Screening      Colonoscopy Screen every 10 years Colonoscopy with a cut with metal- TD and TDaP Not covered by Medicare Part B 01/01/1995 This may be covered with your prescription benefits, but Medicare does not cover unless Medically needed    Zoster  Not covered by Medicare Part B No vaccine history found This ma

## 2021-02-04 ENCOUNTER — LAB ENCOUNTER (OUTPATIENT)
Dept: LAB | Age: 71
End: 2021-02-04
Attending: FAMILY MEDICINE
Payer: MEDICARE

## 2021-02-04 ENCOUNTER — HOSPITAL ENCOUNTER (OUTPATIENT)
Dept: MAMMOGRAPHY | Age: 71
Discharge: HOME | End: 2021-02-04
Attending: FAMILY MEDICINE
Payer: MEDICARE

## 2021-02-04 DIAGNOSIS — Z00.00 LABORATORY EXAMINATION ORDERED AS PART OF A ROUTINE GENERAL MEDICAL EXAMINATION: ICD-10-CM

## 2021-02-04 DIAGNOSIS — Z11.59 ENCOUNTER FOR HEPATITIS C SCREENING TEST FOR LOW RISK PATIENT: ICD-10-CM

## 2021-02-04 DIAGNOSIS — Z12.31 VISIT FOR SCREENING MAMMOGRAM: ICD-10-CM

## 2021-02-04 DIAGNOSIS — E55.9 VITAMIN D DEFICIENCY: ICD-10-CM

## 2021-02-04 LAB
ALBUMIN SERPL-MCNC: 4.2 G/DL (ref 3.4–5)
ALBUMIN/GLOB SERPL: 1.2 {RATIO} (ref 1–2)
ALP LIVER SERPL-CCNC: 100 U/L
ALT SERPL-CCNC: 38 U/L
ANION GAP SERPL CALC-SCNC: 4 MMOL/L (ref 0–18)
AST SERPL-CCNC: 23 U/L (ref 15–37)
BILIRUB SERPL-MCNC: 0.6 MG/DL (ref 0.1–2)
BUN BLD-MCNC: 13 MG/DL (ref 7–18)
BUN/CREAT SERPL: 17.6 (ref 10–20)
CALCIUM BLD-MCNC: 9.6 MG/DL (ref 8.5–10.1)
CHLORIDE SERPL-SCNC: 107 MMOL/L (ref 98–112)
CHOLEST SMN-MCNC: 251 MG/DL (ref ?–200)
CO2 SERPL-SCNC: 30 MMOL/L (ref 21–32)
CREAT BLD-MCNC: 0.74 MG/DL
GLOBULIN PLAS-MCNC: 3.4 G/DL (ref 2.8–4.4)
GLUCOSE BLD-MCNC: 88 MG/DL (ref 70–99)
HCV AB SERPL QL IA: NONREACTIVE
HDLC SERPL-MCNC: 77 MG/DL (ref 40–59)
LDLC SERPL CALC-MCNC: 153 MG/DL (ref ?–100)
M PROTEIN MFR SERPL ELPH: 7.6 G/DL (ref 6.4–8.2)
NONHDLC SERPL-MCNC: 174 MG/DL (ref ?–130)
OSMOLALITY SERPL CALC.SUM OF ELEC: 292 MOSM/KG (ref 275–295)
PATIENT FASTING Y/N/NP: YES
PATIENT FASTING Y/N/NP: YES
POTASSIUM SERPL-SCNC: 4.2 MMOL/L (ref 3.5–5.1)
SODIUM SERPL-SCNC: 141 MMOL/L (ref 136–145)
TRIGL SERPL-MCNC: 104 MG/DL (ref 30–149)
TSI SER-ACNC: 1.64 MIU/ML (ref 0.36–3.74)
VIT D+METAB SERPL-MCNC: 14 NG/ML (ref 30–100)
VLDLC SERPL CALC-MCNC: 21 MG/DL (ref 0–30)

## 2021-02-04 PROCEDURE — 80053 COMPREHEN METABOLIC PANEL: CPT

## 2021-02-04 PROCEDURE — 77067 SCR MAMMO BI INCL CAD: CPT | Performed by: FAMILY MEDICINE

## 2021-02-04 PROCEDURE — 36415 COLL VENOUS BLD VENIPUNCTURE: CPT

## 2021-02-04 PROCEDURE — 80061 LIPID PANEL: CPT

## 2021-02-04 PROCEDURE — 77063 BREAST TOMOSYNTHESIS BI: CPT | Performed by: FAMILY MEDICINE

## 2021-02-04 PROCEDURE — 82306 VITAMIN D 25 HYDROXY: CPT

## 2021-02-04 PROCEDURE — 86803 HEPATITIS C AB TEST: CPT

## 2021-02-04 PROCEDURE — 84443 ASSAY THYROID STIM HORMONE: CPT

## 2021-02-09 NOTE — PROGRESS NOTES
Patient notified of results and recommendations. Patient verbalized understanding and agrees with plan. Future Appointments  2/12/2021  8:00 AM    Estela Sumner NP          EMG 3          [facility]

## 2021-02-12 ENCOUNTER — OFFICE VISIT (OUTPATIENT)
Dept: FAMILY MEDICINE CLINIC | Facility: CLINIC | Age: 71
End: 2021-02-12
Payer: COMMERCIAL

## 2021-02-12 VITALS
WEIGHT: 132.5 LBS | DIASTOLIC BLOOD PRESSURE: 80 MMHG | BODY MASS INDEX: 27.81 KG/M2 | SYSTOLIC BLOOD PRESSURE: 130 MMHG | RESPIRATION RATE: 16 BRPM | HEIGHT: 58 IN | TEMPERATURE: 98 F | HEART RATE: 90 BPM

## 2021-02-12 DIAGNOSIS — Z78.0 POSTMENOPAUSAL: ICD-10-CM

## 2021-02-12 DIAGNOSIS — E78.5 DYSLIPIDEMIA: Primary | ICD-10-CM

## 2021-02-12 PROCEDURE — 99214 OFFICE O/P EST MOD 30 MIN: CPT | Performed by: NURSE PRACTITIONER

## 2021-02-12 PROCEDURE — 3075F SYST BP GE 130 - 139MM HG: CPT | Performed by: NURSE PRACTITIONER

## 2021-02-12 PROCEDURE — 3008F BODY MASS INDEX DOCD: CPT | Performed by: NURSE PRACTITIONER

## 2021-02-12 PROCEDURE — 3079F DIAST BP 80-89 MM HG: CPT | Performed by: NURSE PRACTITIONER

## 2021-02-12 RX ORDER — ROSUVASTATIN CALCIUM 5 MG/1
5 TABLET, COATED ORAL NIGHTLY
Qty: 90 TABLET | Refills: 1 | Status: SHIPPED | OUTPATIENT
Start: 2021-02-12 | End: 2021-05-12

## 2021-02-12 NOTE — PROGRESS NOTES
Patient presents with:  Medical Question: would like to discuss medications       HPI:  Presents for follow up of recent labs with elevated cholesterol levels for 10 yr ASCVD risk of 16% per 2013 ACC/AHA algorithm.  Does not currently take anything for this A/P:    Dyslipidemia  (primary encounter diagnosis)- had long discussion of ASCVD risk and method of calculation. Discussed need to manage lipids better. After discussion patient agreeable to make diet changes but also start rosuvastatin.  She does hope

## 2021-03-17 ENCOUNTER — IMMUNIZATION (OUTPATIENT)
Dept: LAB | Age: 71
End: 2021-03-17
Attending: HOSPITALIST
Payer: MEDICARE

## 2021-03-17 DIAGNOSIS — Z23 NEED FOR VACCINATION: Primary | ICD-10-CM

## 2021-03-17 PROCEDURE — 0001A SARSCOV2 VAC 30MCG/0.3ML IM: CPT

## 2021-04-07 ENCOUNTER — IMMUNIZATION (OUTPATIENT)
Dept: LAB | Age: 71
End: 2021-04-07
Attending: HOSPITALIST
Payer: MEDICARE

## 2021-04-07 DIAGNOSIS — Z23 NEED FOR VACCINATION: Primary | ICD-10-CM

## 2021-04-07 PROCEDURE — 0002A SARSCOV2 VAC 30MCG/0.3ML IM: CPT

## 2021-04-28 ENCOUNTER — LAB ENCOUNTER (OUTPATIENT)
Dept: LAB | Age: 71
End: 2021-04-28
Attending: NURSE PRACTITIONER
Payer: MEDICARE

## 2021-04-28 ENCOUNTER — HOSPITAL ENCOUNTER (OUTPATIENT)
Dept: BONE DENSITY | Age: 71
Discharge: HOME | End: 2021-04-28
Attending: NURSE PRACTITIONER
Payer: MEDICARE

## 2021-04-28 DIAGNOSIS — Z78.0 POSTMENOPAUSAL: ICD-10-CM

## 2021-04-28 DIAGNOSIS — E55.9 VITAMIN D DEFICIENCY: ICD-10-CM

## 2021-04-28 DIAGNOSIS — E78.5 DYSLIPIDEMIA: ICD-10-CM

## 2021-04-28 PROCEDURE — 80053 COMPREHEN METABOLIC PANEL: CPT

## 2021-04-28 PROCEDURE — 77080 DXA BONE DENSITY AXIAL: CPT | Performed by: NURSE PRACTITIONER

## 2021-04-28 PROCEDURE — 36415 COLL VENOUS BLD VENIPUNCTURE: CPT

## 2021-04-28 PROCEDURE — 82306 VITAMIN D 25 HYDROXY: CPT

## 2021-04-30 NOTE — PROGRESS NOTES
Results reviewed with patient per phone. Patient verbalized understanding. Pt was taking vitamin D 50,000 Units weekly. After 4 weeks, what dosage do you want her to take?

## 2021-05-12 ENCOUNTER — PATIENT MESSAGE (OUTPATIENT)
Dept: FAMILY MEDICINE CLINIC | Facility: CLINIC | Age: 71
End: 2021-05-12

## 2021-05-12 RX ORDER — ROSUVASTATIN CALCIUM 5 MG/1
5 TABLET, COATED ORAL NIGHTLY
Qty: 90 TABLET | Refills: 1 | Status: SHIPPED | OUTPATIENT
Start: 2021-05-12 | End: 2021-11-09

## 2021-05-12 NOTE — TELEPHONE ENCOUNTER
From: Clarita Adams  To: Cameron Walters NP  Sent: 5/12/2021 1:02 PM CDT  Subject: Prescription Question    I am ready to run out of my 90 day Rosuvastatin and the computer won't allow me to renew it. Please do so before I run out.  Also, I see that

## 2021-11-09 RX ORDER — ROSUVASTATIN CALCIUM 5 MG/1
TABLET, COATED ORAL
Qty: 90 TABLET | Refills: 1 | Status: SHIPPED | OUTPATIENT
Start: 2021-11-09

## 2021-11-09 NOTE — TELEPHONE ENCOUNTER
Requested Prescriptions     Pending Prescriptions Disp Refills   • ROSUVASTATIN 5 MG Oral Tab [Pharmacy Med Name: ROSUVASTATIN 5MG TABLETS] 90 tablet 1     Sig: TAKE 1 TABLET(5 MG) BY MOUTH EVERY NIGHT     LOV 2/12/2021     Patient was asked to follow-up i

## 2022-03-23 ENCOUNTER — TELEPHONE (OUTPATIENT)
Dept: FAMILY MEDICINE CLINIC | Facility: CLINIC | Age: 72
End: 2022-03-23

## 2022-03-23 NOTE — TELEPHONE ENCOUNTER
Patient is requesting an order for her annual screening mammogram.    Patient has been notified to allow 2-3 business days for order placement. Reviewed with patient that she may schedule mammogram via [employer] or by calling Central Scheduling at that time. Request for mammogram order routed to triage.

## 2022-03-23 NOTE — TELEPHONE ENCOUNTER
1. Dyslipidemia (Primary)  -     Comp Metabolic Panel (14); Future; Expected date: 03/23/2022  -     Lipid Panel; Future; Expected date: 03/23/2022  -     TSH W Reflex To Free T4; Future; Expected date: 03/23/2022  2. Vitamin D deficiency  -     Vitamin D, 25-Hydroxy; Future; Expected date: 03/23/2022  3. Osteopenia determined by x-ray  Overview:  DEXA  Orders:  -     CBC, Platelet; No Differential; Future; Expected date: 03/23/2022  4. Screening, anemia, deficiency, iron  -     CBC, Platelet; No Differential; Future; Expected date: 03/23/2022  5. Laboratory examination ordered as part of a routine general medical examination  -     Comp Metabolic Panel (14); Future; Expected date: 03/23/2022  -     Lipid Panel; Future; Expected date: 03/23/2022  -     TSH W Reflex To Free T4; Future; Expected date: 03/23/2022  -     CBC, Platelet; No Differential; Future; Expected date: 03/23/2022       OK to notify.  Thanks, Katarzyna Paredes MD

## 2022-03-23 NOTE — TELEPHONE ENCOUNTER
Please enter lab orders for the patient's upcoming physical appointment. Physical scheduled: Your appointments     Date & Time Appointment Department [facility])    Apr 27, 2022  2:15 PM CDT MA Supervisit with MD RITA Grayson [email or web address] [facility], [address] [address], [place]  ([facility] Blood)            [address] [place] [address] [facility] [phone]         Preferred lab: [address] [facility])     The patient has been notified to complete fasting labs prior to their physical appointment.

## 2022-04-04 ENCOUNTER — HOSPITAL ENCOUNTER (OUTPATIENT)
Dept: MAMMOGRAPHY | Age: 72
Discharge: HOME | End: 2022-04-04
Attending: FAMILY MEDICINE
Payer: MEDICARE

## 2022-04-04 ENCOUNTER — LABORATORY ENCOUNTER (OUTPATIENT)
Dept: LAB | Age: 72
End: 2022-04-04
Attending: FAMILY MEDICINE
Payer: MEDICARE

## 2022-04-04 DIAGNOSIS — Z12.31 BREAST CANCER SCREENING BY MAMMOGRAM: ICD-10-CM

## 2022-04-04 DIAGNOSIS — M85.80 OSTEOPENIA DETERMINED BY X-RAY: ICD-10-CM

## 2022-04-04 DIAGNOSIS — E78.5 DYSLIPIDEMIA: ICD-10-CM

## 2022-04-04 DIAGNOSIS — Z13.0 SCREENING, ANEMIA, DEFICIENCY, IRON: ICD-10-CM

## 2022-04-04 DIAGNOSIS — E55.9 VITAMIN D DEFICIENCY: ICD-10-CM

## 2022-04-04 DIAGNOSIS — Z00.00 LABORATORY EXAMINATION ORDERED AS PART OF A ROUTINE GENERAL MEDICAL EXAMINATION: ICD-10-CM

## 2022-04-04 LAB
ALBUMIN SERPL-MCNC: 3.9 G/DL (ref 3.4–5)
ALBUMIN/GLOB SERPL: 1.3 {RATIO} (ref 1–2)
ALP LIVER SERPL-CCNC: 85 U/L
ALT SERPL-CCNC: 36 U/L
ANION GAP SERPL CALC-SCNC: 5 MMOL/L (ref 0–18)
AST SERPL-CCNC: 19 U/L (ref 15–37)
BILIRUB SERPL-MCNC: 0.7 MG/DL (ref 0.1–2)
BUN BLD-MCNC: 13 MG/DL (ref 7–18)
CALCIUM BLD-MCNC: 9 MG/DL (ref 8.5–10.1)
CHLORIDE SERPL-SCNC: 109 MMOL/L (ref 98–112)
CHOLEST SERPL-MCNC: 163 MG/DL (ref ?–200)
CO2 SERPL-SCNC: 26 MMOL/L (ref 21–32)
CREAT BLD-MCNC: 0.64 MG/DL
ERYTHROCYTE [DISTWIDTH] IN BLOOD BY AUTOMATED COUNT: 13.2 %
FASTING PATIENT LIPID ANSWER: YES
FASTING STATUS PATIENT QL REPORTED: YES
GLOBULIN PLAS-MCNC: 3.1 G/DL (ref 2.8–4.4)
GLUCOSE BLD-MCNC: 88 MG/DL (ref 70–99)
HCT VFR BLD AUTO: 39.7 %
HDLC SERPL-MCNC: 74 MG/DL (ref 40–59)
HGB BLD-MCNC: 13.4 G/DL
LDLC SERPL CALC-MCNC: 77 MG/DL (ref ?–100)
MCH RBC QN AUTO: 30.3 PG (ref 26–34)
MCHC RBC AUTO-ENTMCNC: 33.8 G/DL (ref 31–37)
MCV RBC AUTO: 89.8 FL
NONHDLC SERPL-MCNC: 89 MG/DL (ref ?–130)
OSMOLALITY SERPL CALC.SUM OF ELEC: 290 MOSM/KG (ref 275–295)
PLATELET # BLD AUTO: 207 10(3)UL (ref 150–450)
POTASSIUM SERPL-SCNC: 4.1 MMOL/L (ref 3.5–5.1)
PROT SERPL-MCNC: 7 G/DL (ref 6.4–8.2)
RBC # BLD AUTO: 4.42 X10(6)UL
SODIUM SERPL-SCNC: 140 MMOL/L (ref 136–145)
TRIGL SERPL-MCNC: 61 MG/DL (ref 30–149)
TSI SER-ACNC: 1.19 MIU/ML (ref 0.36–3.74)
VIT D+METAB SERPL-MCNC: 35.6 NG/ML (ref 30–100)
VLDLC SERPL CALC-MCNC: 9 MG/DL (ref 0–30)
WBC # BLD AUTO: 6.2 X10(3) UL (ref 4–11)

## 2022-04-04 PROCEDURE — 80061 LIPID PANEL: CPT

## 2022-04-04 PROCEDURE — 77067 SCR MAMMO BI INCL CAD: CPT | Performed by: FAMILY MEDICINE

## 2022-04-04 PROCEDURE — 36415 COLL VENOUS BLD VENIPUNCTURE: CPT

## 2022-04-04 PROCEDURE — 85027 COMPLETE CBC AUTOMATED: CPT

## 2022-04-04 PROCEDURE — 84443 ASSAY THYROID STIM HORMONE: CPT

## 2022-04-04 PROCEDURE — 82306 VITAMIN D 25 HYDROXY: CPT

## 2022-04-04 PROCEDURE — 80053 COMPREHEN METABOLIC PANEL: CPT

## 2022-04-04 PROCEDURE — 77063 BREAST TOMOSYNTHESIS BI: CPT | Performed by: FAMILY MEDICINE

## 2022-04-26 NOTE — ASSESSMENT & PLAN NOTE
Stable, Continue present management.     Cholesterol Lowering Medications          ROSUVASTATIN 5 MG Oral Tab

## 2022-04-27 ENCOUNTER — OFFICE VISIT (OUTPATIENT)
Dept: FAMILY MEDICINE CLINIC | Facility: CLINIC | Age: 72
End: 2022-04-27
Payer: COMMERCIAL

## 2022-04-27 VITALS
HEIGHT: 58 IN | RESPIRATION RATE: 18 BRPM | SYSTOLIC BLOOD PRESSURE: 138 MMHG | BODY MASS INDEX: 29.56 KG/M2 | WEIGHT: 140.81 LBS | DIASTOLIC BLOOD PRESSURE: 80 MMHG | HEART RATE: 90 BPM

## 2022-04-27 DIAGNOSIS — Z00.00 ANNUAL PHYSICAL EXAM: Primary | ICD-10-CM

## 2022-04-27 DIAGNOSIS — E55.9 VITAMIN D DEFICIENCY: ICD-10-CM

## 2022-04-27 DIAGNOSIS — M85.80 OSTEOPENIA DETERMINED BY X-RAY: ICD-10-CM

## 2022-04-27 DIAGNOSIS — Z00.00 ENCOUNTER FOR ANNUAL HEALTH EXAMINATION: ICD-10-CM

## 2022-04-27 DIAGNOSIS — E83.52 HYPERCALCEMIA: ICD-10-CM

## 2022-04-27 DIAGNOSIS — E78.5 DYSLIPIDEMIA: ICD-10-CM

## 2022-04-27 PROCEDURE — 3075F SYST BP GE 130 - 139MM HG: CPT | Performed by: FAMILY MEDICINE

## 2022-04-27 PROCEDURE — 3079F DIAST BP 80-89 MM HG: CPT | Performed by: FAMILY MEDICINE

## 2022-04-27 PROCEDURE — 96160 PT-FOCUSED HLTH RISK ASSMT: CPT | Performed by: FAMILY MEDICINE

## 2022-04-27 PROCEDURE — 99387 INIT PM E/M NEW PAT 65+ YRS: CPT | Performed by: FAMILY MEDICINE

## 2022-04-27 PROCEDURE — 3008F BODY MASS INDEX DOCD: CPT | Performed by: FAMILY MEDICINE

## 2022-04-27 PROCEDURE — G0439 PPPS, SUBSEQ VISIT: HCPCS | Performed by: FAMILY MEDICINE

## 2022-04-27 RX ORDER — ROSUVASTATIN CALCIUM 5 MG/1
5 TABLET, COATED ORAL NIGHTLY
Qty: 90 TABLET | Refills: 3 | Status: SHIPPED | OUTPATIENT
Start: 2022-04-27

## 2022-05-13 RX ORDER — ROSUVASTATIN CALCIUM 5 MG/1
TABLET, COATED ORAL
Qty: 90 TABLET | Refills: 1 | Status: SHIPPED | OUTPATIENT
Start: 2022-05-13

## 2022-06-02 ENCOUNTER — HOSPITAL ENCOUNTER (OUTPATIENT)
Dept: CT IMAGING | Age: 72
Discharge: HOME | End: 2022-06-02
Attending: FAMILY MEDICINE

## 2022-06-02 DIAGNOSIS — Z13.6 SCREENING FOR CARDIOVASCULAR CONDITION: ICD-10-CM

## 2022-06-07 ENCOUNTER — OFFICE VISIT (OUTPATIENT)
Dept: FAMILY MEDICINE CLINIC | Facility: CLINIC | Age: 72
End: 2022-06-07
Payer: COMMERCIAL

## 2022-06-07 VITALS
HEIGHT: 59 IN | TEMPERATURE: 98 F | WEIGHT: 130 LBS | RESPIRATION RATE: 18 BRPM | DIASTOLIC BLOOD PRESSURE: 62 MMHG | BODY MASS INDEX: 26.21 KG/M2 | SYSTOLIC BLOOD PRESSURE: 120 MMHG | OXYGEN SATURATION: 96 % | HEART RATE: 100 BPM

## 2022-06-07 DIAGNOSIS — J02.9 SORE THROAT: Primary | ICD-10-CM

## 2022-06-07 DIAGNOSIS — J06.9 VIRAL UPPER RESPIRATORY TRACT INFECTION: ICD-10-CM

## 2022-06-07 LAB
CONTROL LINE PRESENT WITH A CLEAR BACKGROUND (YES/NO): YES YES/NO
STREP GRP A CUL-SCR: NEGATIVE

## 2022-06-07 PROCEDURE — 3074F SYST BP LT 130 MM HG: CPT | Performed by: NURSE PRACTITIONER

## 2022-06-07 PROCEDURE — 87880 STREP A ASSAY W/OPTIC: CPT | Performed by: NURSE PRACTITIONER

## 2022-06-07 PROCEDURE — 3078F DIAST BP <80 MM HG: CPT | Performed by: NURSE PRACTITIONER

## 2022-06-07 PROCEDURE — 3008F BODY MASS INDEX DOCD: CPT | Performed by: NURSE PRACTITIONER

## 2022-06-07 PROCEDURE — 99213 OFFICE O/P EST LOW 20 MIN: CPT | Performed by: NURSE PRACTITIONER

## 2022-06-08 ENCOUNTER — HOSPITAL ENCOUNTER (OUTPATIENT)
Age: 72
Discharge: HOME | End: 2022-06-08
Payer: MEDICARE

## 2022-06-08 VITALS
WEIGHT: 130 LBS | BODY MASS INDEX: 26.21 KG/M2 | HEIGHT: 59 IN | OXYGEN SATURATION: 95 % | TEMPERATURE: 99 F | SYSTOLIC BLOOD PRESSURE: 164 MMHG | RESPIRATION RATE: 16 BRPM | HEART RATE: 83 BPM | DIASTOLIC BLOOD PRESSURE: 83 MMHG

## 2022-06-08 DIAGNOSIS — U07.1 COVID: Primary | ICD-10-CM

## 2022-06-08 LAB — SARS-COV-2 RNA RESP QL NAA+PROBE: DETECTED

## 2022-06-08 PROCEDURE — M0222 INTRAVENOUS INJECTION, BEBTELOVIMAB, INCLUDES INJECTION AND POST ADMINISTRATIVE MONITORING: HCPCS | Performed by: NURSE PRACTITIONER

## 2022-06-08 PROCEDURE — 99214 OFFICE O/P EST MOD 30 MIN: CPT | Performed by: NURSE PRACTITIONER

## 2022-06-08 RX ORDER — BEBTELOVIMAB 87.5 MG/ML
175 INJECTION, SOLUTION INTRAVENOUS ONCE
Status: COMPLETED | OUTPATIENT
Start: 2022-06-08 | End: 2022-06-08

## 2022-06-08 NOTE — PROGRESS NOTES
Informed pt that she is Covid (+), Pt has opted to do the antibody infusion at [address] [place] [address].

## 2022-06-09 ENCOUNTER — TELEPHONE (OUTPATIENT)
Dept: CASE MANAGEMENT | Age: 72
End: 2022-06-09

## 2022-06-09 NOTE — TELEPHONE ENCOUNTER
Pt received MAB infusion at [facility] on 6/8/22 for COVID-19. Please follow-up with pt for post-infusion assessment and home monitoring if needed. Thank you.

## 2022-06-17 ENCOUNTER — TELEMEDICINE (OUTPATIENT)
Dept: FAMILY MEDICINE CLINIC | Facility: CLINIC | Age: 72
End: 2022-06-17

## 2022-06-17 DIAGNOSIS — U07.1 COVID-19 VIRUS INFECTION: Primary | ICD-10-CM

## 2022-06-17 PROCEDURE — 99213 OFFICE O/P EST LOW 20 MIN: CPT | Performed by: NURSE PRACTITIONER

## 2022-06-17 RX ORDER — FLUTICASONE PROPIONATE 50 MCG
SPRAY, SUSPENSION (ML) NASAL
Qty: 48 G | Refills: 0 | OUTPATIENT
Start: 2022-06-17

## 2022-06-17 RX ORDER — FLUTICASONE PROPIONATE 50 MCG
1 SPRAY, SUSPENSION (ML) NASAL 2 TIMES DAILY
Qty: 11.1 ML | Refills: 0 | Status: SHIPPED | OUTPATIENT
Start: 2022-06-17 | End: 2022-06-24

## 2022-11-30 ENCOUNTER — PATIENT MESSAGE (OUTPATIENT)
Dept: FAMILY MEDICINE CLINIC | Facility: CLINIC | Age: 72
End: 2022-11-30

## 2022-11-30 RX ORDER — TRETINOIN 0.5 MG/G
CREAM TOPICAL
Qty: 45 G | Refills: 2 | Status: SHIPPED | OUTPATIENT
Start: 2022-11-30

## 2022-11-30 NOTE — TELEPHONE ENCOUNTER
From: Yuval Badillo  To: Salima Archuleta MD  Sent: 11/30/2022 8:14 AM CST  Subject: Kali Payer Dr. Koko Sánchez,   At our last visit I told you I wasn't ready to refill my Retin A yet so you told me just to send you a note when I was ready. I am ready now. However, since I pay for it out of pocket, is it possible to get a smaller tube so it is less expensive? If not, then go ahead with the regular one. Thanks for your help. (Bo at [facility] in [place]).   Toyin Wallace

## 2023-06-13 ENCOUNTER — TELEPHONE (OUTPATIENT)
Dept: FAMILY MEDICINE CLINIC | Facility: CLINIC | Age: 73
End: 2023-06-13

## 2023-06-13 DIAGNOSIS — Z00.00 LABORATORY EXAMINATION ORDERED AS PART OF A ROUTINE GENERAL MEDICAL EXAMINATION: ICD-10-CM

## 2023-06-13 DIAGNOSIS — E83.52 HYPERCALCEMIA: ICD-10-CM

## 2023-06-13 DIAGNOSIS — E55.9 VITAMIN D DEFICIENCY: Primary | ICD-10-CM

## 2023-06-13 DIAGNOSIS — E78.5 DYSLIPIDEMIA: ICD-10-CM

## 2023-06-13 NOTE — TELEPHONE ENCOUNTER
1. Vitamin D deficiency (Primary)  Overview:  50K weekly  Orders:  -     CBC, Platelet; No Differential; Future; Expected date: 06/13/2023  -     Vitamin D, 25-Hydroxy; Future; Expected date: 06/13/2023  -     PTH Intact with minerals; Future; Expected date: 06/13/2023  2. Dyslipidemia  Overview:  Rosuvastatin 5 mg  Orders:  -     Comp Metabolic Panel (14); Future; Expected date: 06/13/2023  -     Lipid Panel; Future; Expected date: 06/13/2023  -     TSH W Reflex To Free T4; Future; Expected date: 06/13/2023  3. Hypercalcemia  Overview:  Calcium 11.3 in 2013, now normal. PTH 42 in 2011  Orders:  -     CBC, Platelet; No Differential; Future; Expected date: 06/13/2023  -     Vitamin D, 25-Hydroxy; Future; Expected date: 06/13/2023  -     PTH Intact with minerals; Future; Expected date: 06/13/2023  4. Laboratory examination ordered as part of a routine general medical examination  -     Comp Metabolic Panel (14); Future; Expected date: 06/13/2023  -     Lipid Panel; Future; Expected date: 06/13/2023  -     TSH W Reflex To Free T4; Future; Expected date: 06/13/2023  -     CBC, Platelet; No Differential; Future; Expected date: 06/13/2023       OK to notify.  Thanks, Tyrone Mcdonald MD

## 2023-06-13 NOTE — TELEPHONE ENCOUNTER
Please enter lab orders for the patient's upcoming physical appointment. Physical scheduled: Your appointments     Date & Time Appointment Department [facility])    Jun 28, 2023  8:00 AM CDT Medicare Annual Well Visit with MD Joselyn Rao, [address], [place] ([address])            Joselyn Suresh, [address], [facility] [address] [address] [place] [address] [phone]         Preferred lab: [facility])     The patient has been notified to complete fasting labs prior to their physical appointment.

## 2023-06-20 ENCOUNTER — HOSPITAL ENCOUNTER (OUTPATIENT)
Dept: MAMMOGRAPHY | Age: 73
Discharge: HOME | End: 2023-06-20
Attending: FAMILY MEDICINE
Payer: MEDICARE

## 2023-06-20 ENCOUNTER — LAB ENCOUNTER (OUTPATIENT)
Dept: LAB | Age: 73
End: 2023-06-20
Attending: FAMILY MEDICINE
Payer: MEDICARE

## 2023-06-20 DIAGNOSIS — E78.5 DYSLIPIDEMIA: ICD-10-CM

## 2023-06-20 DIAGNOSIS — Z12.31 SCREENING MAMMOGRAM FOR BREAST CANCER: ICD-10-CM

## 2023-06-20 DIAGNOSIS — Z00.00 LABORATORY EXAMINATION ORDERED AS PART OF A ROUTINE GENERAL MEDICAL EXAMINATION: ICD-10-CM

## 2023-06-20 DIAGNOSIS — E55.9 VITAMIN D DEFICIENCY: ICD-10-CM

## 2023-06-20 DIAGNOSIS — E83.52 HYPERCALCEMIA: ICD-10-CM

## 2023-06-20 LAB
ALBUMIN SERPL-MCNC: 3.9 G/DL (ref 3.4–5)
ALBUMIN/GLOB SERPL: 1.1 {RATIO} (ref 1–2)
ALP LIVER SERPL-CCNC: 82 U/L
ALT SERPL-CCNC: 38 U/L
ANION GAP SERPL CALC-SCNC: 5 MMOL/L (ref 0–18)
AST SERPL-CCNC: 24 U/L (ref 15–37)
BILIRUB SERPL-MCNC: 0.6 MG/DL (ref 0.1–2)
BUN BLD-MCNC: 15 MG/DL (ref 7–18)
CALCIUM BLD-MCNC: 9.4 MG/DL (ref 8.5–10.1)
CALCIUM BLD-MCNC: 9.5 MG/DL (ref 8.5–10.1)
CHLORIDE SERPL-SCNC: 109 MMOL/L (ref 98–112)
CHOLEST SERPL-MCNC: 157 MG/DL (ref ?–200)
CO2 SERPL-SCNC: 25 MMOL/L (ref 21–32)
CREAT BLD-MCNC: 0.68 MG/DL
CREAT BLD-MCNC: 0.72 MG/DL
ERYTHROCYTE [DISTWIDTH] IN BLOOD BY AUTOMATED COUNT: 13.1 %
FASTING PATIENT LIPID ANSWER: YES
FASTING STATUS PATIENT QL REPORTED: YES
GFR SERPLBLD BASED ON 1.73 SQ M-ARVRAT: 88 ML/MIN/1.73M2 (ref 60–?)
GLOBULIN PLAS-MCNC: 3.4 G/DL (ref 2.8–4.4)
GLUCOSE BLD-MCNC: 95 MG/DL (ref 70–99)
HCT VFR BLD AUTO: 40.5 %
HDLC SERPL-MCNC: 76 MG/DL (ref 40–59)
HGB BLD-MCNC: 13.2 G/DL
LDLC SERPL CALC-MCNC: 70 MG/DL (ref ?–100)
MCH RBC QN AUTO: 29.9 PG (ref 26–34)
MCHC RBC AUTO-ENTMCNC: 32.6 G/DL (ref 31–37)
MCV RBC AUTO: 91.8 FL
NONHDLC SERPL-MCNC: 81 MG/DL (ref ?–130)
OSMOLALITY SERPL CALC.SUM OF ELEC: 289 MOSM/KG (ref 275–295)
PHOSPHATE SERPL-MCNC: 3.4 MG/DL (ref 2.5–4.9)
PLATELET # BLD AUTO: 203 10(3)UL (ref 150–450)
POTASSIUM SERPL-SCNC: 4.2 MMOL/L (ref 3.5–5.1)
PROT SERPL-MCNC: 7.3 G/DL (ref 6.4–8.2)
PTH-INTACT SERPL-MCNC: 25.1 PG/ML (ref 18.5–88)
RBC # BLD AUTO: 4.41 X10(6)UL
SODIUM SERPL-SCNC: 139 MMOL/L (ref 136–145)
TRIGL SERPL-MCNC: 53 MG/DL (ref 30–149)
TSI SER-ACNC: 0.8 MIU/ML (ref 0.36–3.74)
VIT D+METAB SERPL-MCNC: 47.3 NG/ML (ref 30–100)
VLDLC SERPL CALC-MCNC: 8 MG/DL (ref 0–30)
WBC # BLD AUTO: 5.7 X10(3) UL (ref 4–11)

## 2023-06-20 PROCEDURE — 85027 COMPLETE CBC AUTOMATED: CPT

## 2023-06-20 PROCEDURE — 82310 ASSAY OF CALCIUM: CPT

## 2023-06-20 PROCEDURE — 84443 ASSAY THYROID STIM HORMONE: CPT

## 2023-06-20 PROCEDURE — 83970 ASSAY OF PARATHORMONE: CPT

## 2023-06-20 PROCEDURE — 77063 BREAST TOMOSYNTHESIS BI: CPT | Performed by: FAMILY MEDICINE

## 2023-06-20 PROCEDURE — 80061 LIPID PANEL: CPT

## 2023-06-20 PROCEDURE — 80053 COMPREHEN METABOLIC PANEL: CPT

## 2023-06-20 PROCEDURE — 77067 SCR MAMMO BI INCL CAD: CPT | Performed by: FAMILY MEDICINE

## 2023-06-20 PROCEDURE — 82565 ASSAY OF CREATININE: CPT

## 2023-06-20 PROCEDURE — 84100 ASSAY OF PHOSPHORUS: CPT

## 2023-06-20 PROCEDURE — 82306 VITAMIN D 25 HYDROXY: CPT

## 2023-06-20 PROCEDURE — 36415 COLL VENOUS BLD VENIPUNCTURE: CPT

## 2023-06-24 ENCOUNTER — TELEPHONE (OUTPATIENT)
Dept: FAMILY MEDICINE CLINIC | Facility: CLINIC | Age: 73
End: 2023-06-24

## 2023-06-28 ENCOUNTER — OFFICE VISIT (OUTPATIENT)
Dept: FAMILY MEDICINE CLINIC | Facility: CLINIC | Age: 73
End: 2023-06-28
Payer: COMMERCIAL

## 2023-06-28 VITALS
RESPIRATION RATE: 16 BRPM | DIASTOLIC BLOOD PRESSURE: 70 MMHG | HEIGHT: 59 IN | SYSTOLIC BLOOD PRESSURE: 110 MMHG | BODY MASS INDEX: 24.43 KG/M2 | HEART RATE: 70 BPM | WEIGHT: 121.19 LBS

## 2023-06-28 DIAGNOSIS — E83.52 HYPERCALCEMIA: ICD-10-CM

## 2023-06-28 DIAGNOSIS — N94.10 DYSPAREUNIA IN FEMALE: ICD-10-CM

## 2023-06-28 DIAGNOSIS — M85.80 OSTEOPENIA DETERMINED BY X-RAY: ICD-10-CM

## 2023-06-28 DIAGNOSIS — Z00.00 ANNUAL PHYSICAL EXAM: Primary | ICD-10-CM

## 2023-06-28 DIAGNOSIS — E78.5 DYSLIPIDEMIA: ICD-10-CM

## 2023-06-28 DIAGNOSIS — Z12.11 SCREENING FOR MALIGNANT NEOPLASM OF COLON: ICD-10-CM

## 2023-06-28 DIAGNOSIS — L30.4 INTERTRIGO: ICD-10-CM

## 2023-06-28 DIAGNOSIS — Z00.00 ENCOUNTER FOR ANNUAL HEALTH EXAMINATION: ICD-10-CM

## 2023-06-28 DIAGNOSIS — E55.9 VITAMIN D DEFICIENCY: ICD-10-CM

## 2023-06-28 PROCEDURE — 3074F SYST BP LT 130 MM HG: CPT | Performed by: FAMILY MEDICINE

## 2023-06-28 PROCEDURE — 3008F BODY MASS INDEX DOCD: CPT | Performed by: FAMILY MEDICINE

## 2023-06-28 PROCEDURE — 3078F DIAST BP <80 MM HG: CPT | Performed by: FAMILY MEDICINE

## 2023-06-28 PROCEDURE — 1126F AMNT PAIN NOTED NONE PRSNT: CPT | Performed by: FAMILY MEDICINE

## 2023-06-28 PROCEDURE — 96160 PT-FOCUSED HLTH RISK ASSMT: CPT | Performed by: FAMILY MEDICINE

## 2023-06-28 PROCEDURE — 99214 OFFICE O/P EST MOD 30 MIN: CPT | Performed by: FAMILY MEDICINE

## 2023-06-28 PROCEDURE — 1159F MED LIST DOCD IN RCRD: CPT | Performed by: FAMILY MEDICINE

## 2023-06-28 PROCEDURE — 1160F RVW MEDS BY RX/DR IN RCRD: CPT | Performed by: FAMILY MEDICINE

## 2023-06-28 PROCEDURE — 1170F FXNL STATUS ASSESSED: CPT | Performed by: FAMILY MEDICINE

## 2023-06-28 RX ORDER — CLOTRIMAZOLE AND BETAMETHASONE DIPROPIONATE 10; .64 MG/G; MG/G
1 CREAM TOPICAL 2 TIMES DAILY PRN
Qty: 45 G | Refills: 3 | Status: SHIPPED | OUTPATIENT
Start: 2023-06-28

## 2023-06-28 RX ORDER — TRETINOIN 1 MG/G
1 CREAM TOPICAL NIGHTLY
Qty: 45 G | Refills: 11 | COMMUNITY
Start: 2023-06-28

## 2023-08-18 DIAGNOSIS — E78.5 DYSLIPIDEMIA: ICD-10-CM

## 2023-08-18 RX ORDER — ROSUVASTATIN CALCIUM 5 MG/1
TABLET, COATED ORAL
Qty: 90 TABLET | Refills: 0 | Status: SHIPPED | OUTPATIENT
Start: 2023-08-18

## 2023-08-18 NOTE — TELEPHONE ENCOUNTER
Requested Prescriptions     Pending Prescriptions Disp Refills    ROSUVASTATIN 5 MG Oral Tab [Pharmacy Med Name: ROSUVASTATIN 5MG TABLETS] 90 tablet 1     Sig: TAKE 1 TABLET(5 MG) BY MOUTH EVERY NIGHT     LOV 6/28/2023     Patient was asked to follow-up in: 1 year    Appointment scheduled: Visit date not found     Medication refilled per protocol.

## 2023-08-23 ENCOUNTER — PATIENT MESSAGE (OUTPATIENT)
Dept: FAMILY MEDICINE CLINIC | Facility: CLINIC | Age: 73
End: 2023-08-23

## 2023-08-23 NOTE — TELEPHONE ENCOUNTER
From: Trent Campuzano  To: Thony Merino MD  Sent: 8/23/2023 8:47 AM CDT  Subject: Covid OTC Drugs    I tested positive for Covid on 8/12/23 upon returning from vacation. It was a mild case and I took Mucinex DM and some nose drops I had from the last time I had Covid. I am fine now (no fever or HA or anything) but I can't seem to get rid of this wet cough and sounding like I have a head cold. Do you have any suggestions for me or is there any other medication I should be taking? Thanks.   Jim Zhou

## 2023-08-24 ENCOUNTER — TELEMEDICINE (OUTPATIENT)
Dept: FAMILY MEDICINE CLINIC | Facility: CLINIC | Age: 73
End: 2023-08-24
Payer: COMMERCIAL

## 2023-08-24 DIAGNOSIS — U07.1 COVID-19 VIRUS INFECTION: Primary | ICD-10-CM

## 2023-08-24 PROCEDURE — 99213 OFFICE O/P EST LOW 20 MIN: CPT | Performed by: PHYSICIAN ASSISTANT

## 2023-08-24 PROCEDURE — 1160F RVW MEDS BY RX/DR IN RCRD: CPT | Performed by: PHYSICIAN ASSISTANT

## 2023-08-24 PROCEDURE — 1159F MED LIST DOCD IN RCRD: CPT | Performed by: PHYSICIAN ASSISTANT

## 2023-11-13 DIAGNOSIS — E78.5 DYSLIPIDEMIA: ICD-10-CM

## 2023-11-14 RX ORDER — ROSUVASTATIN CALCIUM 5 MG/1
TABLET, COATED ORAL
Qty: 90 TABLET | Refills: 0 | Status: SHIPPED | OUTPATIENT
Start: 2023-11-14

## 2023-11-14 NOTE — TELEPHONE ENCOUNTER
Requested Prescriptions     Pending Prescriptions Disp Refills    ROSUVASTATIN 5 MG Oral Tab [Pharmacy Med Name: ROSUVASTATIN 5MG TABLETS] 90 tablet 0     Sig: TAKE 1 TABLET(5 MG) BY MOUTH EVERY NIGHT     LOV 6/28/2023     Patient was asked to follow-up in: 1 year    Appointment scheduled: Visit date not found     Medication refilled per protocol.

## 2024-02-09 DIAGNOSIS — E78.5 DYSLIPIDEMIA: ICD-10-CM

## 2024-02-10 RX ORDER — ROSUVASTATIN CALCIUM 5 MG/1
TABLET, COATED ORAL
Qty: 90 TABLET | Refills: 0 | Status: SHIPPED | OUTPATIENT
Start: 2024-02-10

## 2024-02-10 NOTE — TELEPHONE ENCOUNTER
Requested Prescriptions     Pending Prescriptions Disp Refills    ROSUVASTATIN 5 MG Oral Tab [Pharmacy Med Name: ROSUVASTATIN 5MG TABLETS] 90 tablet 0     Sig: TAKE 1 TABLET(5 MG) BY MOUTH EVERY NIGHT     LOV 6/28/2023     Patient was asked to follow-up in:     Appointment scheduled: Visit date not found     Medication refilled per protocol.

## 2024-05-14 DIAGNOSIS — E78.5 DYSLIPIDEMIA: ICD-10-CM

## 2024-05-14 RX ORDER — ROSUVASTATIN CALCIUM 5 MG/1
TABLET, COATED ORAL
Qty: 90 TABLET | Refills: 0 | Status: SHIPPED | OUTPATIENT
Start: 2024-05-14

## 2024-05-14 NOTE — TELEPHONE ENCOUNTER
Requested Prescriptions     Pending Prescriptions Disp Refills    ROSUVASTATIN 5 MG Oral Tab [Pharmacy Med Name: ROSUVASTATIN 5MG TABLETS] 90 tablet 0     Sig: TAKE 1 TABLET(5 MG) BY MOUTH EVERY NIGHT     LOV 6/28/2023 Tele: 8/24/23    Patient was asked to follow-up in: Follow-up not documented in note    Appointment scheduled: Visit date not found     Medication refilled per protocol.

## 2024-05-20 ENCOUNTER — PATIENT MESSAGE (OUTPATIENT)
Dept: FAMILY MEDICINE CLINIC | Facility: CLINIC | Age: 74
End: 2024-05-20

## 2024-05-20 DIAGNOSIS — E83.52 HYPERCALCEMIA: ICD-10-CM

## 2024-05-20 DIAGNOSIS — Z13.6 SCREENING FOR CARDIOVASCULAR CONDITION: ICD-10-CM

## 2024-05-20 DIAGNOSIS — Z12.31 VISIT FOR SCREENING MAMMOGRAM: ICD-10-CM

## 2024-05-20 DIAGNOSIS — Z13.1 SCREENING FOR DIABETES MELLITUS: ICD-10-CM

## 2024-05-20 DIAGNOSIS — M85.80 OSTEOPENIA DETERMINED BY X-RAY: ICD-10-CM

## 2024-05-20 DIAGNOSIS — E55.9 VITAMIN D DEFICIENCY: ICD-10-CM

## 2024-05-20 DIAGNOSIS — Z12.31 SCREENING MAMMOGRAM FOR BREAST CANCER: Primary | ICD-10-CM

## 2024-05-20 DIAGNOSIS — Z13.29 SCREENING FOR THYROID DISORDER: ICD-10-CM

## 2024-05-20 DIAGNOSIS — E78.5 DYSLIPIDEMIA: ICD-10-CM

## 2024-05-20 DIAGNOSIS — Z13.0 SCREENING FOR IRON DEFICIENCY ANEMIA: ICD-10-CM

## 2024-05-21 NOTE — TELEPHONE ENCOUNTER
From: Vicki A Cullen Ullrich  To: Jason Saunders  Sent: 5/20/2024 8:30 PM CDT  Subject: Medical Tests    Please put in an order for my blood test and a Mammogram so I can have that completed before I schedule my Medicare exam with you. Thanks,  Judy

## 2024-05-21 NOTE — TELEPHONE ENCOUNTER
1. Screening mammogram for breast cancer (Primary)  2. Dyslipidemia  Overview:  Rosuvastatin 5 mg  Orders:  -     TSH W Reflex To Free T4; Future; Expected date: 05/21/2024  -     Lipid Panel; Future; Expected date: 05/21/2024  -     Comp Metabolic Panel (14); Future; Expected date: 05/21/2024  3. Vitamin D deficiency  Overview:  50K weekly, un-related to hypercalcemia  Orders:  -     Vitamin D, 25-Hydroxy; Future; Expected date: 05/21/2024  4. Hypercalcemia  Overview:  Calcium 11.3 in 2013, now normal. PTH 42 in 2011, un-related to vitamin D deficiency  Orders:  -     Vitamin D, 25-Hydroxy; Future; Expected date: 05/21/2024  5. Osteopenia determined by x-ray  Overview:  DEXA showed hip -2.0 in 2021, calcium supplements only  Orders:  -     TSH W Reflex To Free T4; Future; Expected date: 05/21/2024  -     CBC With Differential With Platelet; Future; Expected date: 05/21/2024  6. Visit for screening mammogram  -     Mammogram Bilateral Screening with 3D; Future; Expected date: 05/21/2024  7. Screening for diabetes mellitus  -     Comp Metabolic Panel (14); Future; Expected date: 05/21/2024  8. Screening for iron deficiency anemia  -     CBC With Differential With Platelet; Future; Expected date: 05/21/2024  9. Screening for thyroid disorder  -     TSH W Reflex To Free T4; Future; Expected date: 05/21/2024  10. Screening for cardiovascular condition  -     Lipid Panel; Future; Expected date: 05/21/2024       OK to notify. Thanks, Willis Saunders MD

## 2024-08-07 ENCOUNTER — HOSPITAL ENCOUNTER (OUTPATIENT)
Dept: MAMMOGRAPHY | Age: 74
Discharge: HOME | End: 2024-08-07
Attending: FAMILY MEDICINE
Payer: MEDICARE

## 2024-08-07 ENCOUNTER — LABORATORY ENCOUNTER (OUTPATIENT)
Dept: LAB | Age: 74
End: 2024-08-07
Attending: FAMILY MEDICINE
Payer: MEDICARE

## 2024-08-07 DIAGNOSIS — E55.9 VITAMIN D DEFICIENCY: ICD-10-CM

## 2024-08-07 DIAGNOSIS — Z13.29 SCREENING FOR THYROID DISORDER: ICD-10-CM

## 2024-08-07 DIAGNOSIS — Z13.0 SCREENING FOR IRON DEFICIENCY ANEMIA: ICD-10-CM

## 2024-08-07 DIAGNOSIS — Z12.31 VISIT FOR SCREENING MAMMOGRAM: ICD-10-CM

## 2024-08-07 DIAGNOSIS — E78.5 DYSLIPIDEMIA: ICD-10-CM

## 2024-08-07 DIAGNOSIS — E83.52 HYPERCALCEMIA: ICD-10-CM

## 2024-08-07 DIAGNOSIS — Z13.6 SCREENING FOR CARDIOVASCULAR CONDITION: ICD-10-CM

## 2024-08-07 DIAGNOSIS — Z13.1 SCREENING FOR DIABETES MELLITUS: ICD-10-CM

## 2024-08-07 DIAGNOSIS — M85.80 OSTEOPENIA DETERMINED BY X-RAY: ICD-10-CM

## 2024-08-07 LAB
ALBUMIN SERPL-MCNC: 4.6 G/DL (ref 3.2–4.8)
ALBUMIN/GLOB SERPL: 1.6 {RATIO} (ref 1–2)
ALP LIVER SERPL-CCNC: 87 U/L
ALT SERPL-CCNC: 26 U/L
ANION GAP SERPL CALC-SCNC: 7 MMOL/L (ref 0–18)
AST SERPL-CCNC: 25 U/L (ref ?–34)
BASOPHILS # BLD AUTO: 0.03 X10(3) UL (ref 0–0.2)
BASOPHILS NFR BLD AUTO: 0.5 %
BILIRUB SERPL-MCNC: 0.5 MG/DL (ref 0.2–1.1)
BUN BLD-MCNC: 13 MG/DL (ref 9–23)
CALCIUM BLD-MCNC: 10.2 MG/DL (ref 8.7–10.4)
CHLORIDE SERPL-SCNC: 105 MMOL/L (ref 98–112)
CHOLEST SERPL-MCNC: 232 MG/DL (ref ?–200)
CO2 SERPL-SCNC: 27 MMOL/L (ref 21–32)
CREAT BLD-MCNC: 0.72 MG/DL
EGFRCR SERPLBLD CKD-EPI 2021: 87 ML/MIN/1.73M2 (ref 60–?)
EOSINOPHIL # BLD AUTO: 0.08 X10(3) UL (ref 0–0.7)
EOSINOPHIL NFR BLD AUTO: 1.4 %
ERYTHROCYTE [DISTWIDTH] IN BLOOD BY AUTOMATED COUNT: 13.1 %
FASTING PATIENT LIPID ANSWER: YES
FASTING STATUS PATIENT QL REPORTED: YES
GLOBULIN PLAS-MCNC: 2.8 G/DL (ref 2–3.5)
GLUCOSE BLD-MCNC: 90 MG/DL (ref 70–99)
HCT VFR BLD AUTO: 39.9 %
HDLC SERPL-MCNC: 59 MG/DL (ref 40–59)
HGB BLD-MCNC: 13.3 G/DL
IMM GRANULOCYTES # BLD AUTO: 0.06 X10(3) UL (ref 0–1)
IMM GRANULOCYTES NFR BLD: 1 %
LDLC SERPL CALC-MCNC: 148 MG/DL (ref ?–100)
LYMPHOCYTES # BLD AUTO: 1.81 X10(3) UL (ref 1–4)
LYMPHOCYTES NFR BLD AUTO: 31.2 %
MCH RBC QN AUTO: 30.4 PG (ref 26–34)
MCHC RBC AUTO-ENTMCNC: 33.3 G/DL (ref 31–37)
MCV RBC AUTO: 91.3 FL
MONOCYTES # BLD AUTO: 0.51 X10(3) UL (ref 0.1–1)
MONOCYTES NFR BLD AUTO: 8.8 %
NEUTROPHILS # BLD AUTO: 3.31 X10 (3) UL (ref 1.5–7.7)
NEUTROPHILS # BLD AUTO: 3.31 X10(3) UL (ref 1.5–7.7)
NEUTROPHILS NFR BLD AUTO: 57.1 %
NONHDLC SERPL-MCNC: 173 MG/DL (ref ?–130)
OSMOLALITY SERPL CALC.SUM OF ELEC: 288 MOSM/KG (ref 275–295)
PLATELET # BLD AUTO: 200 10(3)UL (ref 150–450)
POTASSIUM SERPL-SCNC: 4.3 MMOL/L (ref 3.5–5.1)
PROT SERPL-MCNC: 7.4 G/DL (ref 5.7–8.2)
RBC # BLD AUTO: 4.37 X10(6)UL
SODIUM SERPL-SCNC: 139 MMOL/L (ref 136–145)
TRIGL SERPL-MCNC: 142 MG/DL (ref 30–149)
TSI SER-ACNC: 1.16 MIU/ML (ref 0.55–4.78)
VIT D+METAB SERPL-MCNC: 28.7 NG/ML (ref 30–100)
VLDLC SERPL CALC-MCNC: 27 MG/DL (ref 0–30)
WBC # BLD AUTO: 5.8 X10(3) UL (ref 4–11)

## 2024-08-07 PROCEDURE — 36415 COLL VENOUS BLD VENIPUNCTURE: CPT

## 2024-08-07 PROCEDURE — 80053 COMPREHEN METABOLIC PANEL: CPT

## 2024-08-07 PROCEDURE — 82306 VITAMIN D 25 HYDROXY: CPT

## 2024-08-07 PROCEDURE — 80061 LIPID PANEL: CPT

## 2024-08-07 PROCEDURE — 85025 COMPLETE CBC W/AUTO DIFF WBC: CPT

## 2024-08-07 PROCEDURE — 77067 SCR MAMMO BI INCL CAD: CPT | Performed by: FAMILY MEDICINE

## 2024-08-07 PROCEDURE — 84443 ASSAY THYROID STIM HORMONE: CPT

## 2024-08-07 PROCEDURE — 77063 BREAST TOMOSYNTHESIS BI: CPT | Performed by: FAMILY MEDICINE

## 2024-08-12 DIAGNOSIS — E78.5 DYSLIPIDEMIA: ICD-10-CM

## 2024-08-12 RX ORDER — ROSUVASTATIN CALCIUM 5 MG/1
TABLET, COATED ORAL
Qty: 90 TABLET | Refills: 0 | Status: SHIPPED | OUTPATIENT
Start: 2024-08-12

## 2024-08-12 NOTE — TELEPHONE ENCOUNTER
Requested Prescriptions     Pending Prescriptions Disp Refills    ROSUVASTATIN 5 MG Oral Tab [Pharmacy Med Name: ROSUVASTATIN 5MG TABLETS] 90 tablet 0     Sig: TAKE 1 TABLET(5 MG) BY MOUTH EVERY NIGHT     LOV 6/28/23 Tele: 8/24/23    Patient was asked to follow-up in: Follow-up not documented in note    Appointment scheduled: 8/26/2024 Jason Saunders MD     Medication refilled per protocol.

## 2024-08-24 NOTE — ASSESSMENT & PLAN NOTE
Last Dexa Scan:    XR DEXA BONE DENSITOMETRY (CPT=77080) 04/28/2021   Stable, continue present management and continue to monitor for progression

## 2024-08-24 NOTE — ASSESSMENT & PLAN NOTE
Cholesterol shows Good control. Long term heart-healthy diet and lifestyle discussed and encouraged to reduce risk of cardiovascular disease.  Cholesterol: 232, done on 8/7/2024.  HDL Cholesterol: 59, done on 8/7/2024.  TriGlycerides 142, done on 8/7/2024.  LDL Cholesterol: 148, done on 8/7/2024.   Cholesterol medications include ROSUVASTATIN 5 MG Oral Tab [[id number]].

## 2024-08-24 NOTE — ASSESSMENT & PLAN NOTE
6/20/2023: Pth Intact 25.1  8/7/2024: Calcium, Total 10.2 stable, continue present management and continue to monitor for progression

## 2024-08-24 NOTE — ASSESSMENT & PLAN NOTE
8/7/2024: Vitamin D, 25OH, Total 28.7 (L) stable, continue present management and continue to monitor for progression

## 2024-08-26 ENCOUNTER — OFFICE VISIT (OUTPATIENT)
Dept: FAMILY MEDICINE CLINIC | Facility: CLINIC | Age: 74
End: 2024-08-26
Payer: COMMERCIAL

## 2024-08-26 VITALS
RESPIRATION RATE: 14 BRPM | DIASTOLIC BLOOD PRESSURE: 84 MMHG | SYSTOLIC BLOOD PRESSURE: 138 MMHG | HEART RATE: 70 BPM | BODY MASS INDEX: 25.08 KG/M2 | WEIGHT: 124.38 LBS | HEIGHT: 59 IN

## 2024-08-26 DIAGNOSIS — N94.10 DYSPAREUNIA IN FEMALE: ICD-10-CM

## 2024-08-26 DIAGNOSIS — Z00.00 ANNUAL PHYSICAL EXAM: Primary | ICD-10-CM
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
## 2024-08-26 DIAGNOSIS — M85.80 OSTEOPENIA DETERMINED BY X-RAY: ICD-10-CM

## 2024-08-26 DIAGNOSIS — Z00.00 ENCOUNTER FOR ANNUAL HEALTH EXAMINATION: ICD-10-CM

## 2024-08-26 DIAGNOSIS — E78.5 DYSLIPIDEMIA: ICD-10-CM

## 2024-08-26 DIAGNOSIS — Z12.11 SCREEN FOR COLON CANCER: ICD-10-CM

## 2024-08-26 DIAGNOSIS — E83.52 HYPERCALCEMIA: ICD-10-CM

## 2024-08-26 DIAGNOSIS — E55.9 VITAMIN D DEFICIENCY: ICD-10-CM

## 2024-08-26 DIAGNOSIS — S70.269D: ICD-10-CM

## 2024-08-26 RX ORDER — ROSUVASTATIN CALCIUM 5 MG/1
5 TABLET, COATED ORAL NIGHTLY
Qty: 90 TABLET | Refills: 3 | Status: SHIPPED
Start: 2024-08-26

## 2024-08-26 RX ORDER — ROSUVASTATIN CALCIUM 5 MG/1
5 TABLET, COATED ORAL NIGHTLY
Qty: 90 TABLET | Refills: 3 | Status: SHIPPED
Start: 2024-08-26 | End: 2024-08-26

## 2024-08-26 RX ORDER — DOXYCYCLINE HYCLATE 100 MG
100 TABLET ORAL 2 TIMES DAILY
Qty: 20 TABLET | Refills: 0 | Status: SHIPPED
Start: 2024-08-26 | End: 2024-09-05

## 2024-08-26 NOTE — PROGRESS NOTES
Subjective:   Vicki A Cullen Ullrich is a 73 year old female who presents for a MA AHA (Medicare Advantage Annual Health Assessment), Subsequent Annual Wellness visit (Pt already had Initial Annual Wellness), and Subsequent Annual Wellness visit (Pt already had Initial Annual Wellness) and scheduled follow up of multiple significant but stable problems.   Cholesterol iff because of paxlovid dueing dry  Got a tick 1 month ago. Took it off but pushed it down the sink.   grand  History/Other:   Fall Risk Assessment:   She has been screened for Falls and is low risk.      Cognitive Assessment:   She had a completely normal cognitive assessment - see flowsheet entries     Functional Ability/Status:   Vicki A Cullen Ullrich has a completely normal functional assessment. See flowsheet for details.      Depression Screening (PHQ):  PHQ-2 SCORE: 0  , done 8/26/2024   If you checked off any problems, how difficult have these problems made it for you to do your work, take care of things at home, or get along with other people?: Not difficult at all         Advanced Directives:   She has a Living Will on file in [facility]; reviewed and discussed documents with patient (and family/surrogate if present).  She does have a POA but we do NOT have it on file in Epic.    Discussed Advance Care Planning with patient (and family/surrogate if present). Standard forms made available to patient in After Visit Summary.      Patient Active Problem List   Diagnosis    Dyspareunia in female    Hypercalcemia    Vitamin D deficiency    Osteopenia determined by x-ray    Dyslipidemia     Allergies:  She has No Known Allergies.    Current Medications:  Outpatient Medications Marked as Taking for the 8/26/24 encounter (Office Visit) with Jason Saunders MD   Medication Sig    Doxycycline Hyclate 100 MG Oral Tab Take 1 tablet (100 mg total) by mouth 2 (two) times daily for 10 days.    rosuvastatin 5 MG Oral Tab Take 1 tablet (5 mg total) by mouth nightly.     RETIN-A 0.1 % External Cream Apply 1 Application topically nightly.    clotrimazole-betamethasone 1-0.05 % External Cream Apply 1 Application topically 2 (two) times daily as needed.       Medical History:  She  has a past medical history of Hypercalcemia (2012), Otosclerosis (2012), and Parathyroid adenoma (3/22/2013).  Surgical History:  She  has a past surgical history that includes  (1970); other surgical history (1969); tonsillectomy (); and other surgical history (Right, 2015).   Family History:  Her family history includes Heart Disorder in her brother and maternal grandmother.  Social History:  She  reports that she has never smoked. She has never used smokeless tobacco. She reports that she does not drink alcohol and does not use drugs.    Tobacco:  She has never smoked tobacco.    CAGE Alcohol Screen:   CAGE screening score of 0 on 2024, showing low risk of alcohol abuse.      Patient Care Team:  Jason Saunders MD as PCP - General (Family Practice)    Review of Systems   Constitutional: Negative.  Negative for activity change, appetite change, chills and fever.   HENT: Negative.     Eyes: Negative.    Respiratory: Negative.  Negative for shortness of breath.    Cardiovascular: Negative.  Negative for chest pain and palpitations.   Gastrointestinal: Negative.  Negative for abdominal pain.   Genitourinary: Negative.  Negative for dysuria.   Musculoskeletal:  Negative for arthralgias.   Skin: Negative.  Negative for rash.   Allergic/Immunologic: Negative.    Neurological: Negative.        Objective:   Physical Exam  Vitals and nursing note reviewed.   Constitutional:       General: She is not in acute distress.     Appearance: Normal appearance.   HENT:      Head: Normocephalic and atraumatic.      Right Ear: Tympanic membrane and external ear normal.      Left Ear: Tympanic membrane and external ear normal.      Nose: Nose normal.      Mouth/Throat:      Mouth:  Mucous membranes are moist.   Eyes:      Extraocular Movements: Extraocular movements intact.      Pupils: Pupils are equal, round, and reactive to light.   Cardiovascular:      Rate and Rhythm: Normal rate and regular rhythm.      Pulses: Normal pulses.           Carotid pulses are 2+ on the right side and 2+ on the left side.       Radial pulses are 2+ on the right side and 2+ on the left side.        Dorsalis pedis pulses are 2+ on the right side and 2+ on the left side.        Posterior tibial pulses are 2+ on the right side and 2+ on the left side.      Heart sounds: Normal heart sounds, S1 normal and S2 normal. No murmur heard.  Pulmonary:      Effort: Pulmonary effort is normal.      Breath sounds: Normal breath sounds.   Abdominal:      General: Abdomen is flat. Bowel sounds are normal. There is no distension.      Palpations: Abdomen is soft.   Musculoskeletal:         General: Normal range of motion.      Cervical back: Normal range of motion and neck supple.      Right lower leg: No edema.      Left lower leg: No edema.   Skin:     General: Skin is warm and dry.      Capillary Refill: Capillary refill takes less than 2 seconds.   Neurological:      General: No focal deficit present.      Mental Status: She is alert and oriented to person, place, and time.   Psychiatric:         Mood and Affect: Mood normal.         Behavior: Behavior normal.         Thought Content: Thought content normal.         /84   Pulse 70   Resp 14   Ht 4' 11\" (1.499 m)   Wt 124 lb 6.4 oz (56.4 kg)   BMI 25.13 kg/m²  Estimated body mass index is 25.13 kg/m² as calculated from the following:    Height as of this encounter: 4' 11\" (1.499 m).    Weight as of this encounter: 124 lb 6.4 oz (56.4 kg).    Medicare Hearing Assessment:   Hearing Screening    Screening Method: Whisper Test  Whisper Test Result: Pass         Visual Acuity:   Right Eye Visual Acuity: Corrected Right Eye Chart Acuity: 20/30   Left Eye Visual Acuity:  Corrected Left Eye Chart Acuity: 20/30   Both Eyes Visual Acuity: Corrected Both Eyes Chart Acuity: 20/30   Able To Tolerate Visual Acuity: Yes        Assessment & Plan:   Vicki A Cullen Ullrich is a 73 year old female who presents for a Medicare Assessment.     1. Annual physical exam (Primary)  2. Dyslipidemia  Overview:  Rosuvastatin 5 mg  Assessment & Plan:  Cholesterol shows Good control. Long term heart-healthy diet and lifestyle discussed and encouraged to reduce risk of cardiovascular disease.  Cholesterol: 232, done on 8/7/2024.  HDL Cholesterol: 59, done on 8/7/2024.  TriGlycerides 142, done on 8/7/2024.  LDL Cholesterol: 148, done on 8/7/2024.   Cholesterol medications include ROSUVASTATIN 5 MG Oral Tab [[id number]].     Orders:  -     Discontinue: Rosuvastatin Calcium; Take 1 tablet (5 mg total) by mouth nightly.  Dispense: 90 tablet; Refill: 3  -     Rosuvastatin Calcium; Take 1 tablet (5 mg total) by mouth nightly.  Dispense: 90 tablet; Refill: 3  -     Expanded, Low Complexity (66574)  3. Vitamin D deficiency  Overview:  50K weekly, un-related to hypercalcemia  Assessment & Plan:  8/7/2024: Vitamin D, 25OH, Total 28.7 (L) stable, continue present management and continue to monitor for progression   4. Osteopenia determined by x-ray  Overview:  DEXA showed hip -2.0 in 2021, calcium supplements only  Assessment & Plan:  Last Dexa Scan:    XR DEXA BONE DENSITOMETRY (CPT=77080) 04/28/2021   Stable, continue present management and continue to monitor for progression   5. Hypercalcemia  Overview:  Calcium 11.3 in 2013, now normal. PTH 42 in 2011, un-related to vitamin D deficiency  Assessment & Plan:  6/20/2023: Pth Intact 25.1  8/7/2024: Calcium, Total 10.2 stable, continue present management and continue to monitor for progression   Orders:  -     Expanded, Low Complexity (61744)  6. Dyspareunia in female  Overview:  Premarin vaginal cream 2x weekly   Assessment & Plan:  Stable, continue present management  and continue to monitor for progression   7. Screen for colon cancer  -     Occult Blood, Fecal, FIT Immunoassay; Future; Expected date: 08/26/2024  8. Tick bite of hip, unspecified laterality, subsequent encounter  -     Doxycycline Hyclate; Take 1 tablet (100 mg total) by mouth 2 (two) times daily for 10 days.  Dispense: 20 tablet; Refill: 0  -     Expanded, Low Complexity (49252)  9. Encounter for annual health examination    Tick bite. Likely deer tick but not lyme, but offered doxyxcycline    The patient indicates understanding of these issues and agrees to the plan.  Reinforced healthy diet, lifestyle, and exercise.      Return in 8 months (on 4/26/2025) for Wellness Visit, AHA (Annual Health Assessment) visit (Supervisit).     Jason Saunders MD, 8/26/2024     Supplementary Documentation:   General Health:  In the past six months, have you lost more than 10 pounds without trying?: 2 - No  Has your appetite been poor?: No  Type of Diet: Balanced  How does the patient maintain a good energy level?: Appropriate Exercise  How would you describe your daily physical activity?: Moderate  How would you describe your current health state?: Good  How do you maintain positive mental well-being?: Social Interaction  On a scale of 0 to 10, with 0 being no pain and 10 being severe pain, what is your pain level?: 0 - (None)  In the past six months, have you experienced urine leakage?: 0-No  At any time do you feel concerned for the safety/well-being of yourself and/or your children, in your home or elsewhere?: No  Have you had any immunizations at another office such as Influenza, Hepatitis B, Tetanus, or Pneumococcal?: No    Health Maintenance   Topic Date Due    Zoster Vaccines (1 of 2) Never done    Colorectal Cancer Screening  07/22/2021    DEXA Scan  04/28/2023    COVID-19 Vaccine (4 - 2023-24 season) 09/01/2023    MA Annual Health Assessment  01/01/2024    Influenza Vaccine (1) 10/01/2024    Mammogram  08/07/2025     Annual Depression Screening  Completed    Fall Risk Screening (Annual)  Completed    Pneumococcal Vaccine: 65+ Years  Completed

## 2024-08-27 NOTE — PATIENT INSTRUCTIONS
Vicki A Cullen Ullrich's SCREENING SCHEDULE   Tests on this list are recommended by your physician but may not be covered, or covered at this frequency, by your insurer.   Please check with your insurance carrier before scheduling to verify coverage.   PREVENTATIVE SERVICES FREQUENCY &  COVERAGE DETAILS LAST COMPLETION DATE   Diabetes Screening    Fasting Blood Sugar /  Glucose    One screening every 12 months if never tested or if previously tested but not diagnosed with pre-diabetes   One screening every 6 months if diagnosed with pre-diabetes Lab Results   Component Value Date    GLU 90 08/07/2024        Cardiovascular Disease Screening    Lipid Panel  Cholesterol  Lipoprotein (HDL)  Triglycerides Covered every 5 years for all Medicare beneficiaries without apparent signs or symptoms of cardiovascular disease Lab Results   Component Value Date    CHOLEST 232 (H) 08/07/2024    HDL 59 08/07/2024     (H) 08/07/2024    TRIG 142 08/07/2024         Electrocardiogram (EKG)   Covered if needed at Welcome to Medicare, and non-screening if indicated for medical reasons 01/02/2015      Ultrasound Screening for Abdominal Aortic Aneurysm (AAA) Covered once in a lifetime for one of the following risk factors   • Men who are 65-75 years old and have ever smoked   • Anyone with a family history -     Colorectal Cancer Screening  Covered for ages 50-85; only need ONE of the following:    Colonoscopy   Covered every 10 years    Covered every 2 years if patient is at high risk or previous colonoscopy was abnormal 08/24/2018    Health Maintenance   Topic Date Due   • Colorectal Cancer Screening  07/22/2021       Flexible Sigmoidoscopy   Covered every 4 years -    Fecal Occult Blood Test Covered annually -   Bone Density Screening    Bone density screening    Covered every 2 years after age 65 if diagnosed with risk of osteoporosis or estrogen deficiency.    Covered yearly for long-term glucocorticoid medication use (Steroids)  Last Dexa Scan:    XR DEXA BONE DENSITOMETRY (CPT=77080) 04/28/2021      Health Maintenance Due   Topic Date Due   • DEXA Scan  04/28/2023      Pap and Pelvic    Pap   Covered every 2 years for women at normal risk; Annually if at high risk -  No recommendations at this time    Chlamydia Annually if high risk -  No recommendations at this time   Screening Mammogram    Mammogram     Recommend annually for all female patients aged 40 and older    One baseline mammogram covered for patients aged 35-39 08/07/2024    Health Maintenance   Topic Date Due   • Mammogram  08/07/2025       Immunizations    Influenza Covered once per flu season  Please get every year -  No recommendations at this time    Pneumococcal Each vaccine ([id number] & [id number]) covered once after 65 Prevnar 13: 10/19/2016    [id number]: 03/26/2018     No recommendations at this time    Hepatitis B One screening covered for patients with certain risk factors   -  No recommendations at this time    Tetanus Toxoid Not covered by Medicare Part B unless medically necessary (cut with metal); may be covered with your pharmacy prescription benefits 01/01/1995    Tetanus, Diptheria and Pertusis TD and TDaP Not covered by Medicare Part B -  No recommendations at this time    Zoster Not covered by Medicare Part B; may be covered with your pharmacy  prescription benefits -  Zoster Vaccines(1 of 2) Never done

## 2024-08-30 DIAGNOSIS — E78.5 DYSLIPIDEMIA: ICD-10-CM

## 2024-08-30 RX ORDER — ROSUVASTATIN CALCIUM 5 MG/1
5 TABLET, COATED ORAL NIGHTLY
Qty: 90 TABLET | Refills: 3 | Status: SHIPPED | OUTPATIENT
Start: 2024-08-30

## 2024-09-02 ENCOUNTER — PATIENT MESSAGE (OUTPATIENT)
Dept: FAMILY MEDICINE CLINIC | Facility: CLINIC | Age: 74
End: 2024-09-02
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
## 2024-09-02 DIAGNOSIS — S70.269D: ICD-10-CM

## 2024-09-03 RX ORDER — DOXYCYCLINE HYCLATE 100 MG
100 TABLET ORAL 2 TIMES DAILY
Qty: 20 TABLET | Refills: 0 | Status: SHIPPED | OUTPATIENT
Start: 2024-09-03 | End: 2024-09-13

## 2024-09-03 RX ORDER — DOXYCYCLINE HYCLATE 100 MG
100 TABLET ORAL 2 TIMES DAILY
Qty: 20 TABLET | Refills: 0 | Status: CANCELLED
Start: 2024-09-03 | End: 2024-09-13

## 2024-09-03 NOTE — TELEPHONE ENCOUNTER
1. Tick bite of hip, unspecified laterality, subsequent encounter  -     Doxycycline Hyclate; Take 1 tablet (100 mg total) by mouth 2 (two) times daily for 10 days.  Dispense: 20 tablet; Refill: 0       OK to notify. Thanks, Willis Saunders MD

## 2024-09-03 NOTE — TELEPHONE ENCOUNTER
From: Vicki A Cullen Ullrich  To: Jason Saunders  Sent: 9/2/2024 7:47 PM CDT  Subject: RX    I went to my [facility] on [address] to  the Diosycycline Hyclate which you prescribed on my visit last week and they said they have nothing from you. Please send it to them again. Thanks. Judy.

## 2025-08-05 DIAGNOSIS — E78.5 DYSLIPIDEMIA: ICD-10-CM

## 2025-08-07 RX ORDER — ROSUVASTATIN CALCIUM 5 MG/1
5 TABLET, COATED ORAL NIGHTLY
Qty: 90 TABLET | Refills: 3 | Status: SHIPPED | OUTPATIENT
Start: 2025-08-07

## 2025-08-08 ENCOUNTER — TELEPHONE (OUTPATIENT)
Dept: FAMILY MEDICINE CLINIC | Facility: CLINIC | Age: 75
End: 2025-08-08

## 2025-08-08 DIAGNOSIS — Z13.29 SCREENING FOR THYROID DISORDER: ICD-10-CM

## 2025-08-08 DIAGNOSIS — E78.5 DYSLIPIDEMIA: ICD-10-CM

## 2025-08-08 DIAGNOSIS — E83.52 HYPERCALCEMIA: Primary | ICD-10-CM

## 2025-08-08 DIAGNOSIS — E55.9 VITAMIN D DEFICIENCY: ICD-10-CM

## 2025-08-08 DIAGNOSIS — Z13.1 SCREENING FOR DIABETES MELLITUS: ICD-10-CM

## 2025-08-08 DIAGNOSIS — Z13.0 SCREENING FOR IRON DEFICIENCY ANEMIA: ICD-10-CM

## 2025-08-08 DIAGNOSIS — Z13.6 SCREENING FOR CARDIOVASCULAR CONDITION: ICD-10-CM

## 2025-08-13 ENCOUNTER — LABORATORY ENCOUNTER (OUTPATIENT)
Dept: LAB | Age: 75
End: 2025-08-13
Attending: FAMILY MEDICINE

## 2025-08-13 ENCOUNTER — HOSPITAL ENCOUNTER (OUTPATIENT)
Dept: MAMMOGRAPHY | Age: 75
Discharge: HOME | End: 2025-08-13
Attending: FAMILY MEDICINE

## 2025-08-13 DIAGNOSIS — Z13.29 SCREENING FOR THYROID DISORDER: ICD-10-CM

## 2025-08-13 DIAGNOSIS — E78.5 DYSLIPIDEMIA: ICD-10-CM

## 2025-08-13 DIAGNOSIS — Z13.0 SCREENING FOR IRON DEFICIENCY ANEMIA: ICD-10-CM

## 2025-08-13 DIAGNOSIS — E83.52 HYPERCALCEMIA: ICD-10-CM

## 2025-08-13 DIAGNOSIS — Z13.1 SCREENING FOR DIABETES MELLITUS: ICD-10-CM

## 2025-08-13 DIAGNOSIS — Z13.6 SCREENING FOR CARDIOVASCULAR CONDITION: ICD-10-CM

## 2025-08-13 DIAGNOSIS — Z12.31 SCREENING MAMMOGRAM FOR BREAST CANCER: ICD-10-CM

## 2025-08-13 DIAGNOSIS — E55.9 VITAMIN D DEFICIENCY: ICD-10-CM

## 2025-08-13 LAB
ALBUMIN SERPL-MCNC: 4.7 G/DL (ref 3.2–4.8)
ALBUMIN/GLOB SERPL: 1.9 (ref 1–2)
ALP LIVER SERPL-CCNC: 74 U/L (ref 55–142)
ALT SERPL-CCNC: 22 U/L (ref 10–49)
ANION GAP SERPL CALC-SCNC: 11 MMOL/L (ref 0–18)
AST SERPL-CCNC: 27 U/L (ref ?–34)
BASOPHILS # BLD AUTO: 0.02 X10(3) UL (ref 0–0.2)
BASOPHILS NFR BLD AUTO: 0.4 %
BILIRUB SERPL-MCNC: 0.7 MG/DL (ref 0.2–1.1)
BUN BLD-MCNC: 13 MG/DL (ref 9–23)
CALCIUM BLD-MCNC: 10.1 MG/DL (ref 8.7–10.6)
CHLORIDE SERPL-SCNC: 105 MMOL/L (ref 98–112)
CHOLEST SERPL-MCNC: 160 MG/DL (ref ?–200)
CO2 SERPL-SCNC: 26 MMOL/L (ref 21–32)
CREAT BLD-MCNC: 0.82 MG/DL (ref 0.55–1.02)
EGFRCR SERPLBLD CKD-EPI 2021: 74 ML/MIN/1.73M2 (ref 60–?)
EOSINOPHIL # BLD AUTO: 0.08 X10(3) UL (ref 0–0.7)
EOSINOPHIL NFR BLD AUTO: 1.4 %
ERYTHROCYTE [DISTWIDTH] IN BLOOD BY AUTOMATED COUNT: 13.1 %
FASTING PATIENT LIPID ANSWER: YES
FASTING STATUS PATIENT QL REPORTED: YES
GLOBULIN PLAS-MCNC: 2.5 G/DL (ref 2–3.5)
GLUCOSE BLD-MCNC: 92 MG/DL (ref 70–99)
HCT VFR BLD AUTO: 39.4 % (ref 35–48)
HDLC SERPL-MCNC: 77 MG/DL (ref 40–59)
HGB BLD-MCNC: 13.2 G/DL (ref 12–16)
IMM GRANULOCYTES # BLD AUTO: 0.02 X10(3) UL (ref 0–1)
IMM GRANULOCYTES NFR BLD: 0.4 %
LDLC SERPL CALC-MCNC: 72 MG/DL (ref ?–100)
LYMPHOCYTES # BLD AUTO: 1.75 X10(3) UL (ref 1–4)
LYMPHOCYTES NFR BLD AUTO: 30.6 %
MCH RBC QN AUTO: 30.2 PG (ref 26–34)
MCHC RBC AUTO-ENTMCNC: 33.5 G/DL (ref 31–37)
MCV RBC AUTO: 90.2 FL (ref 80–100)
MONOCYTES # BLD AUTO: 0.51 X10(3) UL (ref 0.1–1)
MONOCYTES NFR BLD AUTO: 8.9 %
NEUTROPHILS # BLD AUTO: 3.33 X10 (3) UL (ref 1.5–7.7)
NEUTROPHILS # BLD AUTO: 3.33 X10(3) UL (ref 1.5–7.7)
NEUTROPHILS NFR BLD AUTO: 58.3 %
NONHDLC SERPL-MCNC: 83 MG/DL (ref ?–130)
OSMOLALITY SERPL CALC.SUM OF ELEC: 294 MOSM/KG (ref 275–295)
PLATELET # BLD AUTO: 210 10(3)UL (ref 150–450)
POTASSIUM SERPL-SCNC: 4.7 MMOL/L (ref 3.5–5.1)
PROT SERPL-MCNC: 7.2 G/DL (ref 5.7–8.2)
RBC # BLD AUTO: 4.37 X10(6)UL (ref 3.8–5.3)
SODIUM SERPL-SCNC: 142 MMOL/L (ref 136–145)
TRIGL SERPL-MCNC: 53 MG/DL (ref 30–149)
TSI SER-ACNC: 1.1 UIU/ML (ref 0.55–4.78)
VIT D+METAB SERPL-MCNC: 34.3 NG/ML (ref 30–100)
VLDLC SERPL CALC-MCNC: 8 MG/DL (ref 0–30)
WBC # BLD AUTO: 5.7 X10(3) UL (ref 4–11)

## 2025-08-13 PROCEDURE — 80061 LIPID PANEL: CPT

## 2025-08-13 PROCEDURE — 85025 COMPLETE CBC W/AUTO DIFF WBC: CPT

## 2025-08-13 PROCEDURE — 82306 VITAMIN D 25 HYDROXY: CPT

## 2025-08-13 PROCEDURE — 77063 BREAST TOMOSYNTHESIS BI: CPT | Performed by: FAMILY MEDICINE

## 2025-08-13 PROCEDURE — 36415 COLL VENOUS BLD VENIPUNCTURE: CPT

## 2025-08-13 PROCEDURE — 80053 COMPREHEN METABOLIC PANEL: CPT

## 2025-08-13 PROCEDURE — 84443 ASSAY THYROID STIM HORMONE: CPT

## 2025-08-13 PROCEDURE — 77067 SCR MAMMO BI INCL CAD: CPT | Performed by: FAMILY MEDICINE

## 2025-08-25 ENCOUNTER — HOSPITAL ENCOUNTER (OUTPATIENT)
Dept: BONE DENSITY | Age: 75
Discharge: HOME | End: 2025-08-25
Attending: FAMILY MEDICINE

## 2025-08-25 DIAGNOSIS — M85.80 OSTEOPENIA DETERMINED BY X-RAY: ICD-10-CM

## 2025-08-25 PROCEDURE — 77080 DXA BONE DENSITY AXIAL: CPT | Performed by: FAMILY MEDICINE

## (undated) DIAGNOSIS — E78.5 DYSLIPIDEMIA: Primary | ICD-10-CM

## (undated) DIAGNOSIS — E55.9 VITAMIN D DEFICIENCY: ICD-10-CM

## (undated) DIAGNOSIS — Z00.00 LABORATORY EXAMINATION ORDERED AS PART OF A ROUTINE GENERAL MEDICAL EXAMINATION: ICD-10-CM

## (undated) DIAGNOSIS — Z13.0 SCREENING, ANEMIA, DEFICIENCY, IRON: ICD-10-CM

## (undated) DIAGNOSIS — Z12.31 BREAST CANCER SCREENING BY MAMMOGRAM: Primary | ICD-10-CM

## (undated) DIAGNOSIS — M85.80 OSTEOPENIA DETERMINED BY X-RAY: ICD-10-CM

## (undated) DIAGNOSIS — E78.5 DYSLIPIDEMIA: ICD-10-CM